# Patient Record
Sex: FEMALE | Race: WHITE | NOT HISPANIC OR LATINO
[De-identification: names, ages, dates, MRNs, and addresses within clinical notes are randomized per-mention and may not be internally consistent; named-entity substitution may affect disease eponyms.]

---

## 2017-02-24 ENCOUNTER — TRANSCRIPTION ENCOUNTER (OUTPATIENT)
Age: 67
End: 2017-02-24

## 2021-05-11 ENCOUNTER — NON-APPOINTMENT (OUTPATIENT)
Age: 71
End: 2021-05-11

## 2021-05-11 ENCOUNTER — APPOINTMENT (OUTPATIENT)
Dept: CARDIOLOGY | Facility: CLINIC | Age: 71
End: 2021-05-11
Payer: MEDICARE

## 2021-05-11 VITALS
OXYGEN SATURATION: 99 % | DIASTOLIC BLOOD PRESSURE: 62 MMHG | WEIGHT: 156 LBS | HEIGHT: 65 IN | SYSTOLIC BLOOD PRESSURE: 138 MMHG | HEART RATE: 56 BPM | TEMPERATURE: 97.5 F | BODY MASS INDEX: 25.99 KG/M2

## 2021-05-11 PROCEDURE — 93000 ELECTROCARDIOGRAM COMPLETE: CPT

## 2021-05-11 PROCEDURE — 99072 ADDL SUPL MATRL&STAF TM PHE: CPT

## 2021-05-11 PROCEDURE — 99204 OFFICE O/P NEW MOD 45 MIN: CPT

## 2021-05-11 NOTE — HISTORY OF PRESENT ILLNESS
[FreeTextEntry1] : 71-year old female who was recently moved to [place] and who is being seen as a new patient for cardiac follow-up.  She has a history of paroxysmal atrial fibrillation, nonobstructive CAD, hypertension, hypercholesterolemia, and diabetes.  She was switched from diltiazem to sotalol about 6 months ago because of recurring palpitations, but has not had any clinical atrial fibrillation since.  She has no history of underlying structural heart disease and her records show a normal echocardiogram and nuclear stress test done during the past year.  She has no current complaints.

## 2021-05-11 NOTE — REVIEW OF SYSTEMS
[Memory Lapses Or Loss] : memory lapses or loss [Negative] : Gastrointestinal [de-identified] : Issues with recent memory and has stopped driving.

## 2021-05-11 NOTE — DISCUSSION/SUMMARY
[FreeTextEntry1] : Ms Villavicencio is a 71-year-old female with a history of paroxysmal atrial fibrillation and nonobstructive CAD being seen to establish care.  She has no current complaints.  Her exam shows regular rhythm, normal blood pressure, clear lungs, and a normal cardiac exam.  An EKG shows some biphasic T waves in V2 and V3.\par \par She is doing well at present and no changes were made in her regimen.  She will be seeing a new internist and neurologist in the next few weeks.  She will follow-up here in 6 months.

## 2021-05-18 ENCOUNTER — LABORATORY RESULT (OUTPATIENT)
Age: 71
End: 2021-05-18

## 2021-05-18 ENCOUNTER — APPOINTMENT (OUTPATIENT)
Dept: FAMILY MEDICINE | Facility: CLINIC | Age: 71
End: 2021-05-18
Payer: MEDICARE

## 2021-05-18 DIAGNOSIS — Z83.49 FAMILY HISTORY OF OTHER ENDOCRINE, NUTRITIONAL AND METABOLIC DISEASES: ICD-10-CM

## 2021-05-18 DIAGNOSIS — G62.9 POLYNEUROPATHY, UNSPECIFIED: ICD-10-CM

## 2021-05-18 DIAGNOSIS — Z82.49 FAMILY HISTORY OF ISCHEMIC HEART DISEASE AND OTHER DISEASES OF THE CIRCULATORY SYSTEM: ICD-10-CM

## 2021-05-18 DIAGNOSIS — Z72.89 OTHER PROBLEMS RELATED TO LIFESTYLE: ICD-10-CM

## 2021-05-18 DIAGNOSIS — Z78.9 OTHER SPECIFIED HEALTH STATUS: ICD-10-CM

## 2021-05-18 DIAGNOSIS — Z87.891 PERSONAL HISTORY OF NICOTINE DEPENDENCE: ICD-10-CM

## 2021-05-18 PROCEDURE — 99387 INIT PM E/M NEW PAT 65+ YRS: CPT | Mod: GY,25

## 2021-05-18 PROCEDURE — 99072 ADDL SUPL MATRL&STAF TM PHE: CPT

## 2021-05-18 PROCEDURE — 99213 OFFICE O/P EST LOW 20 MIN: CPT | Mod: 25

## 2021-05-20 ENCOUNTER — TRANSCRIPTION ENCOUNTER (OUTPATIENT)
Age: 71
End: 2021-05-20

## 2021-05-20 NOTE — ADDENDUM
[FreeTextEntry1] : I, Gabby Butt acting as a scribe for Dr. Cha Parish MD on 05/18/2021 at 2:54 PM.

## 2021-05-20 NOTE — COUNSELING
[de-identified] : the value and YARELIS  of daily walk, 30 minutes 5 x's per week,  stressed with regard  to overall health, mental health and bone density\par

## 2021-05-20 NOTE — REVIEW OF SYSTEMS
[Fever] : no fever [Fatigue] : fatigue [Night Sweats] : no night sweats [Hearing Loss] : hearing loss [Nasal Discharge] : no nasal discharge [Chest Pain] : no chest pain [Palpitations] : no palpitations [Joint Pain] : joint pain [Joint Stiffness] : joint stiffness [Headache] : no headache [Dizziness] : no dizziness [Anxiety] : no anxiety [Depression] : no depression [Negative] : Gastrointestinal [FreeTextEntry9] : JOHN

## 2021-05-20 NOTE — PLAN
[FreeTextEntry1] : CPE/NP exam  for 71 year old F with PMH as stated in HPI / active list. \par \par Management : \par \par See HPI and Plan\par \par Labs in office today. Will advise. \par \par Best wishes offered !

## 2021-05-20 NOTE — HISTORY OF PRESENT ILLNESS
[Family Member] : family member [FreeTextEntry1] : New patient here to establish care, recently moved here from [place] \par discuss memory issues \par barths [place] \par allergies to Sulfa  [de-identified] : MICHOACANO WADDELL presents today to establish care being referred to me by daughter who is  also a NP here and present today. \par she is an affable 71 year old F with PMH significant for DM, CAD, AFIB, HTN HLD and  neuropathy as well as RA \par Some heightened concerns for memory asper self and family members  Endorses MMSE  29/30\par PSH significant for Hysterectomy > 30 years  ago \par \par Denies any recent ER visits/hospitalizations/ MVA's or MSK injuries.\par \par Providers:  Cardiology  Dr. Frederick\par                    NEEDS ENDO\par                    Requesting Neurology Consult \par \par Social:  lives alone;   ; \par              ALVAREZ user of marijuana   "for anxiety and pain"

## 2021-05-20 NOTE — END OF VISIT
[FreeTextEntry3] : Medical record entries made by the scribe today, were at my direction and personally dictated to them by me, Dr. Cha Parish on 05/18/2021. I have reviewed the chart and agree that the record accurately reflects my personal performance of the history, physical exam, assessment and plan.

## 2021-05-20 NOTE — HEALTH RISK ASSESSMENT
[Monthly or less (1 pt)] : Monthly or less (1 point) [1 or 2 (0 pts)] : 1 or 2 (0 points) [Never (0 pts)] : Never (0 points) [Yes] : In the past 12 months have you used drugs other than those required for medical reasons? Yes [No falls in past year] : Patient reported no falls in the past year [0] : 2) Feeling down, depressed, or hopeless: Not at all (0) [Patient reported mammogram was normal] : Patient reported mammogram was normal [Patient reported colonoscopy was normal] : Patient reported colonoscopy was normal [] : No [de-identified] : marijuana  [MammogramDate] : 02/2020 [ColonoscopyDate] : 01/2016

## 2021-05-25 LAB
CREAT SPEC-SCNC: 127 MG/DL
MICROALBUMIN 24H UR DL<=1MG/L-MCNC: <1.2 MG/DL
MICROALBUMIN/CREAT 24H UR-RTO: NORMAL MG/G

## 2021-06-01 ENCOUNTER — TRANSCRIPTION ENCOUNTER (OUTPATIENT)
Age: 71
End: 2021-06-01

## 2021-06-03 ENCOUNTER — APPOINTMENT (OUTPATIENT)
Dept: NEUROLOGY | Facility: CLINIC | Age: 71
End: 2021-06-03

## 2021-06-10 ENCOUNTER — APPOINTMENT (OUTPATIENT)
Dept: RHEUMATOLOGY | Facility: CLINIC | Age: 71
End: 2021-06-10

## 2021-06-17 ENCOUNTER — TRANSCRIPTION ENCOUNTER (OUTPATIENT)
Age: 71
End: 2021-06-17

## 2021-07-14 ENCOUNTER — TRANSCRIPTION ENCOUNTER (OUTPATIENT)
Age: 71
End: 2021-07-14

## 2021-08-21 ENCOUNTER — RX RENEWAL (OUTPATIENT)
Age: 71
End: 2021-08-21

## 2021-08-25 ENCOUNTER — APPOINTMENT (OUTPATIENT)
Dept: MRI IMAGING | Facility: CLINIC | Age: 71
End: 2021-08-25
Payer: MEDICARE

## 2021-08-25 PROCEDURE — 70551 MRI BRAIN STEM W/O DYE: CPT

## 2021-08-27 ENCOUNTER — NON-APPOINTMENT (OUTPATIENT)
Age: 71
End: 2021-08-27

## 2021-08-31 LAB
HBA1C MFR BLD HPLC: 6.7
LDLC SERPL DIRECT ASSAY-MCNC: 106

## 2021-09-01 ENCOUNTER — APPOINTMENT (OUTPATIENT)
Dept: ENDOCRINOLOGY | Facility: CLINIC | Age: 71
End: 2021-09-01
Payer: MEDICARE

## 2021-09-01 ENCOUNTER — TRANSCRIPTION ENCOUNTER (OUTPATIENT)
Age: 71
End: 2021-09-01

## 2021-09-01 ENCOUNTER — RESULT CHARGE (OUTPATIENT)
Age: 71
End: 2021-09-01

## 2021-09-01 VITALS
OXYGEN SATURATION: 98 % | BODY MASS INDEX: 25.99 KG/M2 | WEIGHT: 156 LBS | HEIGHT: 65 IN | SYSTOLIC BLOOD PRESSURE: 130 MMHG | HEART RATE: 55 BPM | DIASTOLIC BLOOD PRESSURE: 70 MMHG | TEMPERATURE: 97.6 F

## 2021-09-01 LAB — GLUCOSE BLDC GLUCOMTR-MCNC: 181

## 2021-09-01 PROCEDURE — 99205 OFFICE O/P NEW HI 60 MIN: CPT | Mod: 25

## 2021-09-01 PROCEDURE — 82962 GLUCOSE BLOOD TEST: CPT

## 2021-09-01 RX ORDER — BLOOD SUGAR DIAGNOSTIC
STRIP MISCELLANEOUS
Qty: 100 | Refills: 0 | Status: DISCONTINUED | COMMUNITY
Start: 2021-06-01 | End: 2021-09-01

## 2021-09-01 NOTE — ASSESSMENT
[FreeTextEntry1] : 70 yo woman with diagnoses of DM2, HTN, HLD and overweight and also hypoT and Afib comes for initial visit. She has neuropathy. She states she has malabsorption and she has pernicious anemia. \par Daughter concerned about memory impairment. She tried MF IR and severe diarrhea. \par \par DM2 : bgs lunch 's \par continue levemir 18 u Hs \par reluctant to change to orals \par she eats very little and A1c 6.7 bc she eats very little carbs \par discussed diet and exercise\par encouraged more exercise walking 30 min 3 x week\par See CDE for diet teaching\par Needs to try to have more protein for meals\par Importance of blood glucose monitoring discussed. Targets reviewed. Recommended pt try to keep blood glucose level below 130 (110) before meals and below 160 (140) two hours after meals.\par hammer toes: followup with podiatry \par GFr normal. \par continue ASA \par check evelyn/c ratio \par covid vaccine done \par reccomm eye exam annually. \par feet neuropathy: continue with B12 supplement \par \par HypoT / Hashimoto's \par decrease Lt4 88 mcg qd and take 1/2 pill on Sundays \par Take daily in AM on an empty stomach at least 30 minutes before eating or taking other medication.\par \par HTN: bp at target on meds. Continue current management.\par I advised low fat/low cholesterol diet, low salt diet, and weight loss\par \par HLD: LDL at target. continue statin \par low fat/low cholesterol diet and weight loss advised\par \par OverweighT \par discussed diet and exercise\par encouraged more exercise walking 30 min 3 x week\par Needs to try to have more protein for meals\par \par

## 2021-09-01 NOTE — HISTORY OF PRESENT ILLNESS
[FreeTextEntry1] : quality: dm2 \par onset 2016 \par severity: moderate \par modifying factors: diet helps and exercise \par associated factors: RA, HTN, HLD. \par

## 2021-09-20 ENCOUNTER — TRANSCRIPTION ENCOUNTER (OUTPATIENT)
Age: 71
End: 2021-09-20

## 2021-11-08 ENCOUNTER — RX RENEWAL (OUTPATIENT)
Age: 71
End: 2021-11-08

## 2021-11-08 ENCOUNTER — APPOINTMENT (OUTPATIENT)
Dept: CARDIOLOGY | Facility: CLINIC | Age: 71
End: 2021-11-08
Payer: MEDICARE

## 2021-11-08 ENCOUNTER — NON-APPOINTMENT (OUTPATIENT)
Age: 71
End: 2021-11-08

## 2021-11-08 VITALS
DIASTOLIC BLOOD PRESSURE: 75 MMHG | WEIGHT: 153 LBS | HEIGHT: 65 IN | OXYGEN SATURATION: 100 % | SYSTOLIC BLOOD PRESSURE: 119 MMHG | RESPIRATION RATE: 17 BRPM | HEART RATE: 55 BPM | BODY MASS INDEX: 25.49 KG/M2

## 2021-11-08 PROCEDURE — 99214 OFFICE O/P EST MOD 30 MIN: CPT

## 2021-11-08 PROCEDURE — 93000 ELECTROCARDIOGRAM COMPLETE: CPT

## 2021-11-08 NOTE — HISTORY OF PRESENT ILLNESS
[FreeTextEntry1] : 71-year-old female with a history of paroxysmal atrial fibrillation, hypertension, diabetes, hypercholesterolemia, nonobstructive CAD.  She has been feeling generally well with no palpitations.  Her insulin has been increased.  She was started on sertraline 25 mg/day by her psychiatrist, but has decided that she will stop taking it.  She remains physically active and has no exertional complaints.\par \par She had one episode of rapid heart beating with low blood pressure which occurred during a period of extreme emotional distress.  She does not think she was in atrial fibrillation at this time.

## 2021-11-08 NOTE — ASSESSMENT
[FreeTextEntry1] : Ms Villavicencio has no new cardiac complaints and is not having any palpitations.  Her exam shows regular rhythm, normal blood pressure, clear lungs, and a normal cardiac exam.  Her EKG shows sinus rhythm with a corrected QT interval of 468 ms and some borderline ST segment depression.\par \par She is doing well and no change was made in her regimen.  She will continue on sotalol 80 twice daily, irbesartan 300, Xarelto 20, simvastatin 20.  She will follow-up in 6 months.

## 2021-11-17 ENCOUNTER — RX RENEWAL (OUTPATIENT)
Age: 71
End: 2021-11-17

## 2021-11-19 ENCOUNTER — TRANSCRIPTION ENCOUNTER (OUTPATIENT)
Age: 71
End: 2021-11-19

## 2021-11-21 ENCOUNTER — TRANSCRIPTION ENCOUNTER (OUTPATIENT)
Age: 71
End: 2021-11-21

## 2021-11-23 ENCOUNTER — APPOINTMENT (OUTPATIENT)
Dept: FAMILY MEDICINE | Facility: CLINIC | Age: 71
End: 2021-11-23
Payer: MEDICARE

## 2021-11-23 VITALS
RESPIRATION RATE: 16 BRPM | DIASTOLIC BLOOD PRESSURE: 72 MMHG | HEART RATE: 55 BPM | TEMPERATURE: 97.3 F | HEIGHT: 65 IN | SYSTOLIC BLOOD PRESSURE: 112 MMHG | WEIGHT: 149 LBS | BODY MASS INDEX: 24.83 KG/M2 | OXYGEN SATURATION: 93 %

## 2021-11-23 PROCEDURE — 99214 OFFICE O/P EST MOD 30 MIN: CPT

## 2021-11-25 PROCEDURE — 99285 EMERGENCY DEPT VISIT HI MDM: CPT

## 2021-11-26 ENCOUNTER — OUTPATIENT (OUTPATIENT)
Dept: OUTPATIENT SERVICES | Facility: HOSPITAL | Age: 71
LOS: 1 days | End: 2021-11-26

## 2021-11-26 PROCEDURE — 71045 X-RAY EXAM CHEST 1 VIEW: CPT | Mod: 26

## 2021-11-26 PROCEDURE — 93306 TTE W/DOPPLER COMPLETE: CPT | Mod: 26

## 2021-11-26 PROCEDURE — 93010 ELECTROCARDIOGRAM REPORT: CPT

## 2021-11-27 ENCOUNTER — INPATIENT (INPATIENT)
Facility: HOSPITAL | Age: 71
LOS: 3 days | Discharge: HOME | End: 2021-12-01
Payer: MEDICARE

## 2021-11-27 ENCOUNTER — OUTPATIENT (OUTPATIENT)
Dept: OUTPATIENT SERVICES | Facility: HOSPITAL | Age: 71
LOS: 1 days | End: 2021-11-27

## 2021-11-28 ENCOUNTER — OUTPATIENT (OUTPATIENT)
Dept: OUTPATIENT SERVICES | Facility: HOSPITAL | Age: 71
LOS: 1 days | End: 2021-11-28

## 2021-11-28 ENCOUNTER — NON-APPOINTMENT (OUTPATIENT)
Age: 71
End: 2021-11-28

## 2021-11-29 ENCOUNTER — OUTPATIENT (OUTPATIENT)
Dept: OUTPATIENT SERVICES | Facility: HOSPITAL | Age: 71
LOS: 1 days | End: 2021-11-29

## 2021-11-30 ENCOUNTER — OUTPATIENT (OUTPATIENT)
Dept: OUTPATIENT SERVICES | Facility: HOSPITAL | Age: 71
LOS: 1 days | End: 2021-11-30

## 2021-12-01 ENCOUNTER — OUTPATIENT (OUTPATIENT)
Dept: OUTPATIENT SERVICES | Facility: HOSPITAL | Age: 71
LOS: 1 days | End: 2021-12-01

## 2021-12-01 NOTE — HISTORY OF PRESENT ILLNESS
[FreeTextEntry8] : Accompanied by daughter Rosa; \par \par The pt has for 1 week, experienced a  loss of appetite,   low grade fever  ( T max 100.9) none today ,  and fatigue and  "winded " on activities, resolves with rest.

## 2021-12-01 NOTE — ASSESSMENT
[FreeTextEntry1] : Overall vague complaints and symptoms with varied fever pattern in 71 year old WF with Chronic medical conditions as noted in HPI . \par \par Agree at this tie to go to lab tomorrow to assess for anemia/ infection/ electrolytes. \par \par Urged to HYDRATE and report return of altered stools or fever.

## 2021-12-01 NOTE — PHYSICAL EXAM
[No Acute Distress] : no acute distress [Normal Sclera/Conjunctiva] : normal sclera/conjunctiva [No JVD] : no jugular venous distention [No Lymphadenopathy] : no lymphadenopathy [Supple] : supple [No Respiratory Distress] : no respiratory distress  [No Accessory Muscle Use] : no accessory muscle use [Clear to Auscultation] : lungs were clear to auscultation bilaterally [Regular Rhythm] : with a regular rhythm [Normal S1, S2] : normal S1 and S2 [No Edema] : there was no peripheral edema [Soft] : abdomen soft [Non Tender] : non-tender [Non-distended] : non-distended [No HSM] : no HSM [Normal Supraclavicular Nodes] : no supraclavicular lymphadenopathy [No CVA Tenderness] : no CVA  tenderness [No Spinal Tenderness] : no spinal tenderness [No Rash] : no rash [No Focal Deficits] : no focal deficits [Alert and Oriented x3] : oriented to person, place, and time [Normal Insight/Judgement] : insight and judgment were intact [de-identified] : appears frail and weary  [de-identified] : SHADI  [de-identified] : waem and dry  [de-identified] : gait independent

## 2021-12-01 NOTE — REVIEW OF SYSTEMS
[Fatigue] : fatigue [Dyspnea on Exertion] : dyspnea on exertion [Joint Stiffness] : joint stiffness [Depression] : depression [Negative] : Eyes [Fever] : no fever [Chills] : no chills [Nasal Discharge] : no nasal discharge [Postnasal Drip] : no postnasal drip [Chest Pain] : no chest pain [Palpitations] : no palpitations [Paroxysmal Nocturnal Dyspnea] : no paroxysmal nocturnal dyspnea [Cough] : no cough [Dysuria] : no dysuria [Hematuria] : no hematuria [Joint Pain] : no joint pain [Skin Rash] : no skin rash [Headache] : no headache [Fainting] : no fainting [Anxiety] : no anxiety

## 2021-12-02 ENCOUNTER — NON-APPOINTMENT (OUTPATIENT)
Age: 71
End: 2021-12-02

## 2021-12-03 ENCOUNTER — APPOINTMENT (OUTPATIENT)
Dept: FAMILY MEDICINE | Facility: CLINIC | Age: 71
End: 2021-12-03
Payer: MEDICARE

## 2021-12-03 VITALS
HEIGHT: 65 IN | WEIGHT: 147 LBS | DIASTOLIC BLOOD PRESSURE: 60 MMHG | RESPIRATION RATE: 16 BRPM | BODY MASS INDEX: 24.49 KG/M2 | OXYGEN SATURATION: 98 % | HEART RATE: 59 BPM | SYSTOLIC BLOOD PRESSURE: 118 MMHG

## 2021-12-03 PROCEDURE — 99496 TRANSJ CARE MGMT HIGH F2F 7D: CPT

## 2021-12-10 NOTE — PHYSICAL EXAM
[No Acute Distress] : no acute distress [Normal Sclera/Conjunctiva] : normal sclera/conjunctiva [No JVD] : no jugular venous distention [No Lymphadenopathy] : no lymphadenopathy [Supple] : supple [No Respiratory Distress] : no respiratory distress  [No Accessory Muscle Use] : no accessory muscle use [Clear to Auscultation] : lungs were clear to auscultation bilaterally [Regular Rhythm] : with a regular rhythm [Normal S1, S2] : normal S1 and S2 [No Edema] : there was no peripheral edema [Soft] : abdomen soft [Non Tender] : non-tender [Non-distended] : non-distended [No HSM] : no HSM [Normal Supraclavicular Nodes] : no supraclavicular lymphadenopathy [No CVA Tenderness] : no CVA  tenderness [No Spinal Tenderness] : no spinal tenderness [No Rash] : no rash [No Focal Deficits] : no focal deficits [Alert and Oriented x3] : oriented to person, place, and time [Normal Insight/Judgement] : insight and judgment were intact [de-identified] : appears frail and weary  [de-identified] : SHADI  [de-identified] : waem and dry  [de-identified] : gait independent

## 2021-12-10 NOTE — HISTORY OF PRESENT ILLNESS
[Post-hospitalization from ___ Hospital] : Post-hospitalization from [unfilled] Hospital [Admitted on: ___] : The patient was admitted on [unfilled] [Discharged on ___] : discharged on [unfilled] [Discharge Summary] : discharge summary [FreeTextEntry2] : the pt was in the hospital for 6 days after falling, and was diagnosed with dehydration and AFib. \par ID ULLOA reviewed today for negative stools and tick analysis. \par Daughter endorses persistent low grade fever; patent needs much encouragement to eat and drink; weakness and not left alone as son or daughter always in attendance. \par \par Patient herself offers no specific complaint\par \par Did follow with endocrinology , Dr. De La Fuente and opines he does not think this is rheumatology in etiology.\par Did suggest stopping Methotrexate and evaluate fever pattern.

## 2021-12-10 NOTE — ASSESSMENT
[FreeTextEntry1] : S/P 3 day hospitalization for FUO and generalized weakness in 71 year old WF with PMH of AFib, HTN, T2 DM Iron deficiency anemia, RA.\par \par Agrees with supportive care at this time.\par Discussed importance of hydration with patient and she did drink 20 ounces of iced tea in office today. \par \par Trend temperatures at this time off methotrexate. \par \par Will follow for remaining labs as done in hospital day of discharge.\par \par Best wishes offered. \par \par \par

## 2021-12-13 ENCOUNTER — RX RENEWAL (OUTPATIENT)
Age: 71
End: 2021-12-13

## 2021-12-20 ENCOUNTER — TRANSCRIPTION ENCOUNTER (OUTPATIENT)
Age: 71
End: 2021-12-20

## 2021-12-28 ENCOUNTER — LABORATORY RESULT (OUTPATIENT)
Age: 71
End: 2021-12-28

## 2021-12-28 ENCOUNTER — APPOINTMENT (OUTPATIENT)
Dept: ENDOCRINOLOGY | Facility: CLINIC | Age: 71
End: 2021-12-28
Payer: SELF-PAY

## 2021-12-28 VITALS
RESPIRATION RATE: 16 BRPM | SYSTOLIC BLOOD PRESSURE: 120 MMHG | WEIGHT: 150 LBS | HEIGHT: 65 IN | OXYGEN SATURATION: 98 % | HEART RATE: 63 BPM | BODY MASS INDEX: 24.99 KG/M2 | TEMPERATURE: 96.7 F | DIASTOLIC BLOOD PRESSURE: 70 MMHG

## 2021-12-28 PROCEDURE — 99214 OFFICE O/P EST MOD 30 MIN: CPT

## 2021-12-29 NOTE — PHYSICAL EXAM
[Alert] : alert [Well Nourished] : well nourished [No Acute Distress] : no acute distress [Well Developed] : well developed [Normal Sclera/Conjunctiva] : normal sclera/conjunctiva [EOMI] : extra ocular movement intact [No Proptosis] : no proptosis [Normal Oropharynx] : the oropharynx was normal [Thyroid Not Enlarged] : the thyroid was not enlarged [No Thyroid Nodules] : no palpable thyroid nodules [No Respiratory Distress] : no respiratory distress [No Accessory Muscle Use] : no accessory muscle use [Clear to Auscultation] : lungs were clear to auscultation bilaterally [Normal S1, S2] : normal S1 and S2 [Normal Rate] : heart rate was normal [Regular Rhythm] : with a regular rhythm [No Edema] : no peripheral edema [Pedal Pulses Normal] : the pedal pulses are present [Normal Bowel Sounds] : normal bowel sounds [Not Tender] : non-tender [Not Distended] : not distended [Soft] : abdomen soft [Normal Anterior Cervical Nodes] : no anterior cervical lymphadenopathy [Spine Straight] : spine straight [No Stigmata of Cushings Syndrome] : no stigmata of Cushings Syndrome [Normal Gait] : normal gait [No Rash] : no rash [No Tremors] : no tremors [Oriented x3] : oriented to person, place, and time [Normal] : normal [Full ROM] : with full range of motion [Acanthosis Nigricans] : no acanthosis nigricans [Diminished Throughout Both Feet] : normal tactile sensation with monofilament testing throughout both feet

## 2021-12-29 NOTE — ASSESSMENT
[Carbohydrate Consistent Diet] : carbohydrate consistent diet [Exercise/Effect on Glucose] : exercise/effect on glucose [Self Monitoring of Blood Glucose] : self monitoring of blood glucose [FreeTextEntry1] : 72 yo woman with diagnoses of DM2, HTN, HLD and overweight and also hypoT and Afib comes for followup. She has neuropathy. She states she has malabsorption and she has pernicious anemia. \par Memory impairment. She tried MF IR and severe diarrhea. \par \par DM2 : she started prednisone for 10 days from rheumatology \par continue levemir 18 u Hs \par reluctant to change to orals \par discussed diet and exercise\par encouraged more exercise walking 30 min 3 x week\par Needs to try to have more protein for meals\par hammer toes: followup with podiatry \par check labs \par continue ASA \par check evelyn/c ratio \par utd eye exam  \par feet neuropathy: continue with B12 supplement \par \par HypoT / Hashimoto's \par cont Lt4 88 mcg qd and take 1/2 pill on Sundays \par \par HTN: bp at target on meds. Continue current management.\par I advised low fat/low cholesterol diet, low salt diet, and weight loss\par \par HLD: check lipids. continue statin \par low fat/low cholesterol diet and weight loss advised\par \par OverweighT \par discussed diet and exercise\par encouraged more exercise walking 30 min 3 x week\par \par

## 2022-01-03 ENCOUNTER — RX RENEWAL (OUTPATIENT)
Age: 72
End: 2022-01-03

## 2022-01-05 ENCOUNTER — NON-APPOINTMENT (OUTPATIENT)
Age: 72
End: 2022-01-05

## 2022-01-11 ENCOUNTER — APPOINTMENT (OUTPATIENT)
Dept: CARDIOLOGY | Facility: CLINIC | Age: 72
End: 2022-01-11
Payer: MEDICARE

## 2022-01-11 ENCOUNTER — NON-APPOINTMENT (OUTPATIENT)
Age: 72
End: 2022-01-11

## 2022-01-11 VITALS
DIASTOLIC BLOOD PRESSURE: 70 MMHG | HEIGHT: 65 IN | HEART RATE: 55 BPM | BODY MASS INDEX: 24.99 KG/M2 | WEIGHT: 150 LBS | SYSTOLIC BLOOD PRESSURE: 126 MMHG | OXYGEN SATURATION: 99 %

## 2022-01-11 PROCEDURE — 99214 OFFICE O/P EST MOD 30 MIN: CPT

## 2022-01-11 PROCEDURE — 93000 ELECTROCARDIOGRAM COMPLETE: CPT

## 2022-01-11 NOTE — HISTORY OF PRESENT ILLNESS
[FreeTextEntry1] : 71-year-old female with a history of paroxysmal atrial fibrillation, hypertension, hyper cholesterolemia, diabetes, nonobstructive CAD returns for follow-up.  She was hospitalized in late November with fever, dehydration, near syncope, and was in atrial fibrillation at the time she arrived in the emergency room.  The cause of the fever was never determined and was felt to possibly be related to her methotrexate.  She was restarted on sotalol and has been home for about a month.  She has not had any palpitations and feels generally well, although her daughter is concerned that she is still weak and there is a questionable history of dyspnea on exertion..

## 2022-01-11 NOTE — DISCUSSION/SUMMARY
[FreeTextEntry1] : Ms Villavicencio had a provoked episode of atrial fibrillation in late October.  She has not had any palpitations since her return home about a month ago.  Her exam shows regular rhythm, rate of about 50, normal blood pressure, clear lungs, and a normal cardiac exam.  Her EKG shows sinus rhythm and is unremarkable with a corrected QT of 480 ms.\par \par She seems back at her baseline.  Her daughter is concerned about her overall condition, but she had an echo done in the hospital and has had a normal nuclear stress test with her stress test within the past 2 years and I do not think she requires further work-up at this time.  She will follow-up according to her usual schedule or if she develops further symptoms.

## 2022-01-24 ENCOUNTER — TRANSCRIPTION ENCOUNTER (OUTPATIENT)
Age: 72
End: 2022-01-24

## 2022-03-04 ENCOUNTER — TRANSCRIPTION ENCOUNTER (OUTPATIENT)
Age: 72
End: 2022-03-04

## 2022-03-10 ENCOUNTER — RX RENEWAL (OUTPATIENT)
Age: 72
End: 2022-03-10

## 2022-03-23 ENCOUNTER — APPOINTMENT (OUTPATIENT)
Dept: ENDOCRINOLOGY | Facility: CLINIC | Age: 72
End: 2022-03-23
Payer: MEDICARE

## 2022-03-23 VITALS
DIASTOLIC BLOOD PRESSURE: 60 MMHG | RESPIRATION RATE: 14 BRPM | HEART RATE: 67 BPM | WEIGHT: 154.25 LBS | BODY MASS INDEX: 25.7 KG/M2 | SYSTOLIC BLOOD PRESSURE: 106 MMHG | HEIGHT: 65 IN | TEMPERATURE: 97.9 F | OXYGEN SATURATION: 99 %

## 2022-03-23 PROCEDURE — 99214 OFFICE O/P EST MOD 30 MIN: CPT

## 2022-03-23 NOTE — ASSESSMENT
[Exercise/Effect on Glucose] : exercise/effect on glucose [Weight Loss] : weight loss [FreeTextEntry1] : 70 yo woman with diagnoses of DM2, HTN, HLD and overweight and also hypoT and Afib comes for followup. She has neuropathy. She states she has malabsorption and she has pernicious anemia. \par Memory impairment. She tried MF IR and severe diarrhea. \par \par DM2 :  last a1c 6.7. \par start metformin 500 mg ER 1 tb BID \par decrease levemir to 9 u hs and send logbook in 1 week \par will try to susana her off insulin and switch to orals  \par discussed diet and exercise\par encouraged more exercise walking 30 min 3 x week\par Needs to try to have more protein for meals\par hammer toes: followup with podiatry \par check labs today \par continue ASA \par check evelyn/c ratio \par utd eye exam  \par diabetic  neuropathy: continue with B12 supplement \par \par HypoT / Hashimoto's \par check tfts today and will call tp w results. \par cont Lt4 88 mcg qd and take 1/2 pill on Sundays \par \par HTN: bp at target on meds. Continue current management.\par I advised low fat/low cholesterol diet, low salt diet, and weight loss\par \par HLD: check lipids. continue statin \par low fat/low cholesterol diet and weight loss advised\par \par OverweighT : gained 4 lbs. \par discussed diet and exercise\par encouraged more exercise walking 30 min 3 x week\par \par

## 2022-03-25 ENCOUNTER — TRANSCRIPTION ENCOUNTER (OUTPATIENT)
Age: 72
End: 2022-03-25

## 2022-03-25 LAB
ALBUMIN SERPL ELPH-MCNC: 4.6 G/DL
ALP BLD-CCNC: 101 U/L
ALT SERPL-CCNC: 15 U/L
ANION GAP SERPL CALC-SCNC: 13 MMOL/L
AST SERPL-CCNC: 20 U/L
BASOPHILS # BLD AUTO: 0.06 K/UL
BASOPHILS NFR BLD AUTO: 0.9 %
BILIRUB SERPL-MCNC: 0.5 MG/DL
BUN SERPL-MCNC: 18 MG/DL
CALCIUM SERPL-MCNC: 9.6 MG/DL
CHLORIDE SERPL-SCNC: 104 MMOL/L
CHOLEST SERPL-MCNC: 206 MG/DL
CO2 SERPL-SCNC: 24 MMOL/L
CREAT SERPL-MCNC: 0.81 MG/DL
CREAT SPEC-SCNC: 96 MG/DL
EGFR: 76 ML/MIN/1.73M2
EOSINOPHIL # BLD AUTO: 0.27 K/UL
EOSINOPHIL NFR BLD AUTO: 4.2 %
ESTIMATED AVERAGE GLUCOSE: 157 MG/DL
GLUCOSE SERPL-MCNC: 131 MG/DL
HBA1C MFR BLD HPLC: 7.1 %
HCT VFR BLD CALC: 39.8 %
HDLC SERPL-MCNC: 48 MG/DL
HGB BLD-MCNC: 12.2 G/DL
IMM GRANULOCYTES NFR BLD AUTO: 0.3 %
LDLC SERPL CALC-MCNC: 114 MG/DL
LDLC SERPL DIRECT ASSAY-MCNC: 120 MG/DL
LYMPHOCYTES # BLD AUTO: 1.21 K/UL
LYMPHOCYTES NFR BLD AUTO: 18.6 %
MAN DIFF?: NORMAL
MCHC RBC-ENTMCNC: 30 PG
MCHC RBC-ENTMCNC: 30.7 GM/DL
MCV RBC AUTO: 97.8 FL
MICROALBUMIN 24H UR DL<=1MG/L-MCNC: <1.2 MG/DL
MICROALBUMIN/CREAT 24H UR-RTO: NORMAL MG/G
MONOCYTES # BLD AUTO: 0.33 K/UL
MONOCYTES NFR BLD AUTO: 5.1 %
NEUTROPHILS # BLD AUTO: 4.61 K/UL
NEUTROPHILS NFR BLD AUTO: 70.9 %
NONHDLC SERPL-MCNC: 158 MG/DL
PLATELET # BLD AUTO: 251 K/UL
POTASSIUM SERPL-SCNC: 4.9 MMOL/L
PROT SERPL-MCNC: 6.6 G/DL
RBC # BLD: 4.07 M/UL
RBC # FLD: 15.6 %
SODIUM SERPL-SCNC: 141 MMOL/L
T3 SERPL-MCNC: 122 NG/DL
T4 FREE SERPL-MCNC: 1.8 NG/DL
TRIGL SERPL-MCNC: 219 MG/DL
TSH SERPL-ACNC: 0.39 UIU/ML
WBC # FLD AUTO: 6.5 K/UL

## 2022-03-28 ENCOUNTER — TRANSCRIPTION ENCOUNTER (OUTPATIENT)
Age: 72
End: 2022-03-28

## 2022-04-01 ENCOUNTER — NON-APPOINTMENT (OUTPATIENT)
Age: 72
End: 2022-04-01

## 2022-04-18 ENCOUNTER — RX RENEWAL (OUTPATIENT)
Age: 72
End: 2022-04-18

## 2022-04-19 ENCOUNTER — NON-APPOINTMENT (OUTPATIENT)
Age: 72
End: 2022-04-19

## 2022-05-04 ENCOUNTER — APPOINTMENT (OUTPATIENT)
Dept: ENDOCRINOLOGY | Facility: CLINIC | Age: 72
End: 2022-05-04
Payer: MEDICARE

## 2022-05-04 VITALS
WEIGHT: 151 LBS | OXYGEN SATURATION: 97 % | HEIGHT: 65 IN | RESPIRATION RATE: 14 BRPM | SYSTOLIC BLOOD PRESSURE: 90 MMHG | DIASTOLIC BLOOD PRESSURE: 60 MMHG | TEMPERATURE: 97 F | HEART RATE: 58 BPM | BODY MASS INDEX: 25.16 KG/M2

## 2022-05-04 PROCEDURE — 99214 OFFICE O/P EST MOD 30 MIN: CPT

## 2022-05-04 RX ORDER — INSULIN DETEMIR 100 [IU]/ML
100 INJECTION, SOLUTION SUBCUTANEOUS
Qty: 1 | Refills: 0 | Status: DISCONTINUED | COMMUNITY
Start: 2021-05-11 | End: 2022-05-04

## 2022-05-04 NOTE — ASSESSMENT
[Carbohydrate Consistent Diet] : carbohydrate consistent diet [Exercise/Effect on Glucose] : exercise/effect on glucose [Self Monitoring of Blood Glucose] : self monitoring of blood glucose [FreeTextEntry1] : 72 yo woman with diagnoses of DM2, HTN, HLD and overweight and also hypoT and Afib. \par She has neuropathy. She states she has malabsorption.Memory impairment. She tried MF IR and severe diarrhea. \par \par DM2 : bgs excellent. \par cont metformin 500 mg ER 1 tb BID \par she has diarrhea with Mf. \par decrease MF  mg qd and add jardiance 10 mg qd  \par discussed diet and exercise\par encouraged more exercise walking 30 min 3 x week\par hammer toes: followup with podiatry \par continue ASA \par check evelyn/c ratio \par diabetic  neuropathy: continue with B12 supplement \par \par HypoT / Hashimoto's \par cont Lt4 88 mcg qd and take 1/2 pill on Sundays \par \par HTN: bp at target on meds. Continue current management.\par I advised low fat/low cholesterol diet, low salt diet, and weight loss\par \par HLD:  LDL above target. She will d/w cardiology soon. statin \par low fat/low cholesterol diet and weight loss advised\par \par OverweighT : discussed diet and exercise\par encouraged more exercise walking 30 min 3 x week\par \par

## 2022-05-04 NOTE — PHYSICAL EXAM
[Alert] : alert [Well Nourished] : well nourished [No Acute Distress] : no acute distress [Well Developed] : well developed [Normal Sclera/Conjunctiva] : normal sclera/conjunctiva [EOMI] : extra ocular movement intact [No Proptosis] : no proptosis [Normal Oropharynx] : the oropharynx was normal [Thyroid Not Enlarged] : the thyroid was not enlarged [No Thyroid Nodules] : no palpable thyroid nodules [No Respiratory Distress] : no respiratory distress [No Accessory Muscle Use] : no accessory muscle use [Clear to Auscultation] : lungs were clear to auscultation bilaterally [Normal S1, S2] : normal S1 and S2 [Normal Rate] : heart rate was normal [Regular Rhythm] : with a regular rhythm [No Edema] : no peripheral edema [Pedal Pulses Normal] : the pedal pulses are present [Normal Bowel Sounds] : normal bowel sounds [Not Tender] : non-tender [Not Distended] : not distended [Soft] : abdomen soft [Normal Anterior Cervical Nodes] : no anterior cervical lymphadenopathy [No Tremors] : no tremors [Oriented x3] : oriented to person, place, and time [Right foot was examined, including] : right foot ~C was examined, including visual inspection with sensory and pulse exams [Left foot was examined, including] : left foot ~C was examined, including visual inspection with sensory and pulse exams [Normal] : normal [Full ROM] : with full range of motion [Acanthosis Nigricans] : no acanthosis nigricans [Delayed in the Right Toes] : normal in the toes [Delayed in the Left Toes] : normal in the toes [Vibration Dec.] : normal vibratory sensation at the level of the toes

## 2022-05-09 ENCOUNTER — NON-APPOINTMENT (OUTPATIENT)
Age: 72
End: 2022-05-09

## 2022-05-09 ENCOUNTER — APPOINTMENT (OUTPATIENT)
Dept: CARDIOLOGY | Facility: CLINIC | Age: 72
End: 2022-05-09
Payer: MEDICARE

## 2022-05-09 VITALS
BODY MASS INDEX: 24.96 KG/M2 | DIASTOLIC BLOOD PRESSURE: 60 MMHG | WEIGHT: 150 LBS | SYSTOLIC BLOOD PRESSURE: 120 MMHG | HEART RATE: 55 BPM | OXYGEN SATURATION: 100 %

## 2022-05-09 PROCEDURE — 93000 ELECTROCARDIOGRAM COMPLETE: CPT

## 2022-05-09 PROCEDURE — 99214 OFFICE O/P EST MOD 30 MIN: CPT

## 2022-05-09 RX ORDER — SIMVASTATIN 20 MG/1
20 TABLET, FILM COATED ORAL
Qty: 90 | Refills: 3 | Status: DISCONTINUED | COMMUNITY
Start: 2021-05-11 | End: 2022-05-09

## 2022-05-09 NOTE — ASSESSMENT
[FreeTextEntry1] : Ms Villavicencio has not had palpitations or other cardiac symptoms.  She is noticing some nausea and mild diarrhea which she thinks may be due to metformin, but she has had similar symptoms prior to starting the drug.  Her exam shows regular rhythm, normal blood pressure, clear lungs, and a normal cardiac exam.  Her EKG shows sinus rhythm with a corrected QT interval of 478 ms.\par \par And she is stable at present.  I discontinued her simvastatin and switched her to rosuvastatin 20.  I also suggested she consider getting a sonal for her smart watch if she wanted to know if she was in atrial fibrillation when she had some symptoms.  She\par \par She will continue on sotalol 80 twice daily, Xarelto 20, irbesartan 300, metformin, Jardiance 10, and newly started rosuvastatin 20.  She will follow-up in 4 months.

## 2022-05-09 NOTE — HISTORY OF PRESENT ILLNESS
[FreeTextEntry1] : 72-year-old female with a history of paroxysmal atrial fibrillation, nonobstructive CAD, hypertension, hypercholesterolemia, diabetes.  She has not had any palpitations or other cardiac symptoms since she was last seen in January.  She has been taken off insulin by her endocrinologist and is now on metformin and Jardiance.  She is having some GI side effects.  Her endocrinologist also noted her LDL cholesterol of 114 on simvastatin 20 and suggested that she might switch statins.

## 2022-05-10 ENCOUNTER — TRANSCRIPTION ENCOUNTER (OUTPATIENT)
Age: 72
End: 2022-05-10

## 2022-05-27 ENCOUNTER — APPOINTMENT (OUTPATIENT)
Dept: FAMILY MEDICINE | Facility: CLINIC | Age: 72
End: 2022-05-27
Payer: MEDICARE

## 2022-05-27 VITALS
SYSTOLIC BLOOD PRESSURE: 116 MMHG | BODY MASS INDEX: 24.83 KG/M2 | OXYGEN SATURATION: 97 % | RESPIRATION RATE: 16 BRPM | TEMPERATURE: 97.1 F | WEIGHT: 149 LBS | HEART RATE: 50 BPM | HEIGHT: 65 IN | DIASTOLIC BLOOD PRESSURE: 64 MMHG

## 2022-05-27 DIAGNOSIS — R19.7 DIARRHEA, UNSPECIFIED: ICD-10-CM

## 2022-05-27 DIAGNOSIS — Z86.39 PERSONAL HISTORY OF OTHER ENDOCRINE, NUTRITIONAL AND METABOLIC DISEASE: ICD-10-CM

## 2022-05-27 DIAGNOSIS — R53.1 WEAKNESS: ICD-10-CM

## 2022-05-27 DIAGNOSIS — Z00.00 ENCOUNTER FOR GENERAL ADULT MEDICAL EXAMINATION W/OUT ABNORMAL FINDINGS: ICD-10-CM

## 2022-05-27 DIAGNOSIS — Z86.19 PERSONAL HISTORY OF OTHER INFECTIOUS AND PARASITIC DISEASES: ICD-10-CM

## 2022-05-27 DIAGNOSIS — R50.9 FEVER, UNSPECIFIED: ICD-10-CM

## 2022-05-27 PROCEDURE — G0442 ANNUAL ALCOHOL SCREEN 15 MIN: CPT | Mod: 59

## 2022-05-27 PROCEDURE — G0439: CPT

## 2022-05-27 PROCEDURE — G0444 DEPRESSION SCREEN ANNUAL: CPT

## 2022-05-31 ENCOUNTER — INPATIENT (INPATIENT)
Facility: HOSPITAL | Age: 72
LOS: 0 days | Discharge: HOME | End: 2022-06-01
Payer: MEDICARE

## 2022-05-31 PROCEDURE — 72170 X-RAY EXAM OF PELVIS: CPT | Mod: 26

## 2022-05-31 PROCEDURE — 70450 CT HEAD/BRAIN W/O DYE: CPT | Mod: 26,MA

## 2022-05-31 PROCEDURE — 93010 ELECTROCARDIOGRAM REPORT: CPT

## 2022-05-31 PROCEDURE — 99285 EMERGENCY DEPT VISIT HI MDM: CPT

## 2022-05-31 PROCEDURE — 72125 CT NECK SPINE W/O DYE: CPT | Mod: 26,MA

## 2022-05-31 PROCEDURE — 71045 X-RAY EXAM CHEST 1 VIEW: CPT | Mod: 26

## 2022-06-01 ENCOUNTER — OUTPATIENT (OUTPATIENT)
Dept: OUTPATIENT SERVICES | Facility: HOSPITAL | Age: 72
LOS: 1 days | End: 2022-06-01

## 2022-06-01 PROBLEM — Z00.00 ENCOUNTER FOR PREVENTIVE HEALTH EXAMINATION: Status: ACTIVE | Noted: 2021-05-04

## 2022-06-01 PROBLEM — R19.7 FREQUENT LOOSE STOOLS: Status: ACTIVE | Noted: 2022-05-27

## 2022-06-01 PROBLEM — Z86.19 HISTORY OF VIRAL INFECTION: Status: RESOLVED | Noted: 2021-12-01 | Resolved: 2022-06-01

## 2022-06-01 PROCEDURE — 99223 1ST HOSP IP/OBS HIGH 75: CPT

## 2022-06-01 PROCEDURE — 70450 CT HEAD/BRAIN W/O DYE: CPT | Mod: 26

## 2022-06-01 NOTE — COUNSELING
[de-identified] : AGAIN advised the value and YARELIS  of daily walk, 30 minutes 5 x's per week,  stressed with regard  to overall health, mental health and bone density\par

## 2022-06-01 NOTE — ASSESSMENT
[FreeTextEntry1] : Annual exam for 72  year old WF with PMH as stated in HPI / active list. \par \par Management : \par \par Declines MAMMO  \par Cologuard for CRC Screen.\par \par \par See HPI and Plan\par \par Labs in office today.   Will advise. \par \par Best wishes offered !\par

## 2022-06-01 NOTE — HISTORY OF PRESENT ILLNESS
[Family Member] : family member [FreeTextEntry1] : [facility]\par \par Last seen in DEC. 2021 :   In review: Dec. 3, 2021: \par FU after hospital visit in December 2021 \par Eventually recovered and diagnosis for Ilness next fully determined. \par \par Since has been following with : \par ENDO: Dr. Bartlett DM type 2 MF / Jardiance/B12\par Cardiology Dr. Otoniel PA Afib/CAD/HTN/HLD; now on Crestor; advised sonal for  cell phone to monitor for AFib; takes sotalol and irbesartan Xarelto. \par \par Rheumatology Dr. De La Fuente  RA  MTX/Folic Acid \par \par No recent ER /UC visits.\par No FALL.  [de-identified] : \par \par In review: NP visit May 2021\par MICHOACANO WADDELL presents today to establish care being referred to me by daughter who is  also a NP here and present today. \par she is an affable 71 year old F with PMH significant for DM, CAD, AFIB, HTN HLD and  neuropathy as well as RA \par Some heightened concerns for memory asper self and family members  Endorses MMSE  29/30\par PSH significant for Hysterectomy > 30 years  ago \par \par Denies any recent ER visits/hospitalizations/ MVA's or MSK injuries.\par \par Providers:  Cardiology  Dr. Frederick\par                    NEEDS ENDO\par                    Requesting Neurology Consult \par \par Social:  lives alone;   ; \par              ALVAREZ user of marijuana   "for anxiety and pain"

## 2022-06-01 NOTE — REVIEW OF SYSTEMS
[Fatigue] : fatigue [Hearing Loss] : hearing loss [Joint Pain] : joint pain [Joint Stiffness] : joint stiffness [Negative] : Gastrointestinal [Fever] : no fever [Night Sweats] : no night sweats [Nasal Discharge] : no nasal discharge [Chest Pain] : no chest pain [Palpitations] : no palpitations [Headache] : no headache [Dizziness] : no dizziness [Anxiety] : no anxiety [Depression] : no depression [FreeTextEntry9] : JOHN

## 2022-06-01 NOTE — HEALTH RISK ASSESSMENT
[Good] : ~his/her~  mood as  good [Monthly or less (1 pt)] : Monthly or less (1 point) [1 or 2 (0 pts)] : 1 or 2 (0 points) [Never (0 pts)] : Never (0 points) [Yes] : In the past 12 months have you used drugs other than those required for medical reasons? Yes [No falls in past year] : Patient reported no falls in the past year [0] : 2) Feeling down, depressed, or hopeless: Not at all (0) [PHQ-2 Negative - No further assessment needed] : PHQ-2 Negative - No further assessment needed [Patient reported mammogram was normal] : Patient reported mammogram was normal [Patient reported colonoscopy was normal] : Patient reported colonoscopy was normal [With Patient/Caregiver] : , with patient/caregiver [Designated Healthcare Proxy] : Designated healthcare proxy [Name: ___] : Health Care Proxy's Name: [unfilled]  [Relationship: ___] : Relationship: [unfilled] [de-identified] : Abbie [de-identified] : as noted in hpi  [de-identified] : NO [de-identified] : marijuana daily  for anxiety and pain  [de-identified] : not really   [de-identified] : helathy  [[id number]] : 0 [MammogramDate] : 02/2020 [MammogramComments] : [facility]  [ColonoscopyDate] : 01/2016 [AdvancecareDate] : 05/27/2002

## 2022-06-02 ENCOUNTER — TRANSCRIPTION ENCOUNTER (OUTPATIENT)
Age: 72
End: 2022-06-02

## 2022-06-02 ENCOUNTER — NON-APPOINTMENT (OUTPATIENT)
Age: 72
End: 2022-06-02

## 2022-06-06 ENCOUNTER — TRANSCRIPTION ENCOUNTER (OUTPATIENT)
Age: 72
End: 2022-06-06

## 2022-06-06 DIAGNOSIS — E03.9 HYPOTHYROIDISM, UNSPECIFIED: ICD-10-CM

## 2022-06-06 DIAGNOSIS — Z82.49 FAMILY HISTORY OF ISCHEMIC HEART DISEASE AND OTHER DISEASES OF THE CIRCULATORY SYSTEM: ICD-10-CM

## 2022-06-06 DIAGNOSIS — R55 SYNCOPE AND COLLAPSE: ICD-10-CM

## 2022-06-06 DIAGNOSIS — T14.90XA INJURY, UNSPECIFIED, INITIAL ENCOUNTER: ICD-10-CM

## 2022-06-06 DIAGNOSIS — E11.9 TYPE 2 DIABETES MELLITUS WITHOUT COMPLICATIONS: ICD-10-CM

## 2022-06-06 DIAGNOSIS — Z20.822 CONTACT WITH AND (SUSPECTED) EXPOSURE TO COVID-19: ICD-10-CM

## 2022-06-06 DIAGNOSIS — E78.5 HYPERLIPIDEMIA, UNSPECIFIED: ICD-10-CM

## 2022-06-06 DIAGNOSIS — I48.0 PAROXYSMAL ATRIAL FIBRILLATION: ICD-10-CM

## 2022-06-06 DIAGNOSIS — Z79.52 LONG TERM (CURRENT) USE OF SYSTEMIC STEROIDS: ICD-10-CM

## 2022-06-06 DIAGNOSIS — Z79.01 LONG TERM (CURRENT) USE OF ANTICOAGULANTS: ICD-10-CM

## 2022-06-06 DIAGNOSIS — M06.9 RHEUMATOID ARTHRITIS, UNSPECIFIED: ICD-10-CM

## 2022-06-06 DIAGNOSIS — Z79.84 LONG TERM (CURRENT) USE OF ORAL HYPOGLYCEMIC DRUGS: ICD-10-CM

## 2022-06-06 DIAGNOSIS — F12.90 CANNABIS USE, UNSPECIFIED, UNCOMPLICATED: ICD-10-CM

## 2022-06-06 DIAGNOSIS — I10 ESSENTIAL (PRIMARY) HYPERTENSION: ICD-10-CM

## 2022-06-06 DIAGNOSIS — Z87.891 PERSONAL HISTORY OF NICOTINE DEPENDENCE: ICD-10-CM

## 2022-06-06 DIAGNOSIS — R00.1 BRADYCARDIA, UNSPECIFIED: ICD-10-CM

## 2022-06-07 ENCOUNTER — APPOINTMENT (OUTPATIENT)
Dept: FAMILY MEDICINE | Facility: CLINIC | Age: 72
End: 2022-06-07
Payer: MEDICARE

## 2022-06-07 VITALS
OXYGEN SATURATION: 98 % | HEART RATE: 74 BPM | TEMPERATURE: 97.3 F | DIASTOLIC BLOOD PRESSURE: 78 MMHG | HEIGHT: 65 IN | RESPIRATION RATE: 16 BRPM | SYSTOLIC BLOOD PRESSURE: 134 MMHG | BODY MASS INDEX: 24.83 KG/M2 | WEIGHT: 149 LBS

## 2022-06-07 VITALS — SYSTOLIC BLOOD PRESSURE: 126 MMHG | HEART RATE: 72 BPM | DIASTOLIC BLOOD PRESSURE: 72 MMHG

## 2022-06-07 LAB
ALBUMIN SERPL ELPH-MCNC: 4.7 G/DL
ALP BLD-CCNC: 101 U/L
ALT SERPL-CCNC: 11 U/L
ANION GAP SERPL CALC-SCNC: 16 MMOL/L
AST SERPL-CCNC: 20 U/L
BASOPHILS # BLD AUTO: 0.07 K/UL
BASOPHILS NFR BLD AUTO: 0.8 %
BILIRUB SERPL-MCNC: 0.6 MG/DL
BUN SERPL-MCNC: 30 MG/DL
CALCIUM SERPL-MCNC: 9.9 MG/DL
CHLORIDE SERPL-SCNC: 103 MMOL/L
CHOLEST SERPL-MCNC: 141 MG/DL
CO2 SERPL-SCNC: 20 MMOL/L
CREAT SERPL-MCNC: 1.05 MG/DL
EGFR: 56 ML/MIN/1.73M2
EOSINOPHIL # BLD AUTO: 0.29 K/UL
EOSINOPHIL NFR BLD AUTO: 3.2 %
ESTIMATED AVERAGE GLUCOSE: 157 MG/DL
FOLATE SERPL-MCNC: >20 NG/ML
GLUCOSE SERPL-MCNC: 116 MG/DL
HBA1C MFR BLD HPLC: 7.1 %
HCT VFR BLD CALC: 36 %
HDLC SERPL-MCNC: 46 MG/DL
HGB BLD-MCNC: 11.6 G/DL
IMM GRANULOCYTES NFR BLD AUTO: 0.3 %
LDLC SERPL CALC-MCNC: 67 MG/DL
LYMPHOCYTES # BLD AUTO: 1.59 K/UL
LYMPHOCYTES NFR BLD AUTO: 17.3 %
MAN DIFF?: NORMAL
MCHC RBC-ENTMCNC: 29.9 PG
MCHC RBC-ENTMCNC: 32.2 GM/DL
MCV RBC AUTO: 92.8 FL
MONOCYTES # BLD AUTO: 0.32 K/UL
MONOCYTES NFR BLD AUTO: 3.5 %
NEUTROPHILS # BLD AUTO: 6.9 K/UL
NEUTROPHILS NFR BLD AUTO: 74.9 %
NONHDLC SERPL-MCNC: 95 MG/DL
PLATELET # BLD AUTO: 279 K/UL
POTASSIUM SERPL-SCNC: 5 MMOL/L
PROT SERPL-MCNC: 6.9 G/DL
RBC # BLD: 3.88 M/UL
RBC # FLD: 14.3 %
SODIUM SERPL-SCNC: 140 MMOL/L
TRIGL SERPL-MCNC: 142 MG/DL
VIT B12 SERPL-MCNC: 693 PG/ML
WBC # FLD AUTO: 9.2 K/UL

## 2022-06-07 PROCEDURE — 99496 TRANSJ CARE MGMT HIGH F2F 7D: CPT

## 2022-06-07 NOTE — REVIEW OF SYSTEMS
[Fatigue] : fatigue [Negative] : Musculoskeletal [Fever] : no fever [Night Sweats] : no night sweats [Headache] : no headache [Dizziness] : no dizziness [Confusion] : no confusion [Memory Loss] : no memory loss [FreeTextEntry2] : chronic

## 2022-06-07 NOTE — ASSESSMENT
[FreeTextEntry1] : Megan appears to well well compensated with VS normalized off HTN medications ( Irbesartan and Sotalol) . \par \par Advised re: hydration !\par Bring HOME BP cuff to Cardiology encounter later this week. \par \par Best wishes!

## 2022-06-07 NOTE — PHYSICAL EXAM
[No Acute Distress] : no acute distress [Normal Sclera/Conjunctiva] : normal sclera/conjunctiva [PERRL] : pupils equal round and reactive to light [Normal Oropharynx] : the oropharynx was normal [Supple] : supple [No Accessory Muscle Use] : no accessory muscle use [Clear to Auscultation] : lungs were clear to auscultation bilaterally [Normal Rate] : normal rate  [Regular Rhythm] : with a regular rhythm [No Joint Swelling] : no joint swelling [Alert and Oriented x3] : oriented to person, place, and time [Normal Insight/Judgement] : insight and judgment were intact [de-identified] : calm  [de-identified] : NC  AT   [de-identified] : HR  72  irregular ( P AFIB KNOWN)  [de-identified] : n [de-identified] : no pronator drift

## 2022-06-07 NOTE — HISTORY OF PRESENT ILLNESS
[Post-hospitalization from ___ Hospital] : Post-hospitalization from [unfilled] Hospital [Admitted on: ___] : The patient was admitted on [unfilled] [Discharged on ___] : discharged on [unfilled] [Radiology Findings] : radiology findings [FreeTextEntry2] : Accompanied by daughter, Rosa: \par the pt collapsed and hit her head on the floor ( witnessed by GC) on MAY 31 ; readily aroused.\par Went to ER and hr noted to be in 40's and BP low .\par CT of brain x 2 UNREMARKABLE.\par Pelvis X RAY no fracture \par CXR  no rib fx or pneumothorax\par \par Monitored overnight and discharged (6/1) with BP Meds  on HOLD. \par Since has been monitoring BP at home and reviewed for average /70  HR averages 70's \par \par Megna denies any H/A , confusion, nausea.; No pain of scalp \par \par

## 2022-06-08 ENCOUNTER — RX RENEWAL (OUTPATIENT)
Age: 72
End: 2022-06-08

## 2022-06-08 ENCOUNTER — NON-APPOINTMENT (OUTPATIENT)
Age: 72
End: 2022-06-08

## 2022-06-08 ENCOUNTER — APPOINTMENT (OUTPATIENT)
Dept: CARDIOLOGY | Facility: CLINIC | Age: 72
End: 2022-06-08
Payer: MEDICARE

## 2022-06-08 VITALS
HEART RATE: 114 BPM | HEIGHT: 65 IN | WEIGHT: 147.71 LBS | DIASTOLIC BLOOD PRESSURE: 80 MMHG | SYSTOLIC BLOOD PRESSURE: 160 MMHG | BODY MASS INDEX: 24.61 KG/M2 | OXYGEN SATURATION: 97 %

## 2022-06-08 PROCEDURE — 99214 OFFICE O/P EST MOD 30 MIN: CPT

## 2022-06-08 PROCEDURE — 93000 ELECTROCARDIOGRAM COMPLETE: CPT

## 2022-06-08 NOTE — HISTORY OF PRESENT ILLNESS
[FreeTextEntry1] : 72-year-old female with a history of paroxysmal atrial fibrillation, nonobstructive CAD, hypertension, hypercholesterolemia, and type 2 diabetes.    Last week she collapsed (did not lose consciousness) and was taken to an emergency room where she was said to have a slow heart rate and low blood pressure.  She was told to hold her irbesartan and sotalol until she was seen here.  Since that time she has been in and out of atrial fibrillation.  Her blood pressures are averaging about 140/80.\par \par Her most recent blood work shows an LDL cholesterol of 67 and an A1c of 7.1.

## 2022-06-08 NOTE — ASSESSMENT
[FreeTextEntry1] : Ms Villavicencio has been in and out of atrial fibrillation since discontinuation of her sotalol last week.  Her exam shows a heart rate of about 130, clear lungs, normal blood pressure, and an otherwise normal cardiac exam.  Her EKG confirms that she is in atrial fibrillation.\par \par It is unclear if she collapsed last week because of slow heart rate or low blood pressure.  She will restart her sotalol 80 mg twice daily and return in a week.  After this she will be monitored to see if she is becoming significantly bradycardic and a determination will be made about restarting her antihypertensive therapy
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
## 2022-06-10 DIAGNOSIS — R00.1 BRADYCARDIA, UNSPECIFIED: ICD-10-CM

## 2022-06-10 DIAGNOSIS — R42 DIZZINESS AND GIDDINESS: ICD-10-CM

## 2022-06-15 ENCOUNTER — APPOINTMENT (OUTPATIENT)
Dept: CARDIOLOGY | Facility: CLINIC | Age: 72
End: 2022-06-15

## 2022-06-15 VITALS
WEIGHT: 148 LBS | HEART RATE: 52 BPM | DIASTOLIC BLOOD PRESSURE: 62 MMHG | BODY MASS INDEX: 24.63 KG/M2 | OXYGEN SATURATION: 100 % | SYSTOLIC BLOOD PRESSURE: 130 MMHG

## 2022-06-15 DIAGNOSIS — R55 SYNCOPE AND COLLAPSE: ICD-10-CM

## 2022-06-15 PROCEDURE — 99214 OFFICE O/P EST MOD 30 MIN: CPT | Mod: 25

## 2022-06-15 PROCEDURE — 93224 XTRNL ECG REC UP TO 48 HRS: CPT

## 2022-06-15 NOTE — DISCUSSION/SUMMARY
[FreeTextEntry1] : Ms Villavicencio has not had any near syncopal episodes during the past week back on sotalol 80 twice daily.  Her exam shows a regular bradycardia at a rate of about 50 and normal blood pressure.\par \par Although her device indicates that she is in atrial fibrillation at times her heart rate is never rapid and since she she is typically about 140 while in atrial fibrillation I think it is unlikely that she really is having episodes of atrial fibrillation.\par \par She will be monitored to see how slow her heart rates And to see if any atrial fibrillation is still present.  If the monitoring is satisfactory she will remain on sotalol 80 twice daily alone.

## 2022-06-15 NOTE — HISTORY OF PRESENT ILLNESS
[FreeTextEntry1] : 72-year-old female with a history of paroxysmal atrial fibrillation and hypertension who is being seen in follow-up after a recent syncopal episode.  She was seen last week and her sotalol was restarted.  She has been taking her heart rate and blood pressure since.  Her blood pressures have been satisfactory off irbesartan.  Her heart rates are averaging about 50 and sometimes her monitor think she is in atrial fibrillation.

## 2022-07-11 ENCOUNTER — FORM ENCOUNTER (OUTPATIENT)
Age: 72
End: 2022-07-11

## 2022-07-12 ENCOUNTER — APPOINTMENT (OUTPATIENT)
Dept: INTERNAL MEDICINE | Facility: CLINIC | Age: 72
End: 2022-07-12

## 2022-07-12 ENCOUNTER — TRANSCRIPTION ENCOUNTER (OUTPATIENT)
Age: 72
End: 2022-07-12

## 2022-07-12 DIAGNOSIS — U07.1 COVID-19: ICD-10-CM

## 2022-07-12 PROCEDURE — 99202 OFFICE O/P NEW SF 15 MIN: CPT | Mod: 95

## 2022-07-12 PROCEDURE — 99212 OFFICE O/P EST SF 10 MIN: CPT | Mod: 95

## 2022-07-12 NOTE — HISTORY OF PRESENT ILLNESS
[Home] : at home, [unfilled] , at the time of the visit. [Medical Office: ([facility])___] : at the medical office located in  [Family Member] : family member [Verbal consent obtained from patient] : the patient, [unfilled] [FreeTextEntry8] : 73 yo F with h/o DM, PAF, HTN, HLD, RA, \par to [facility] care for acute visit - tested positive for COVID today.\par Daughter with patient during visit\par \par Started to feel unwell yesterday, sore throat, sinus congestion.  Woke up this morning with additional sx of fever, malaise, myalgias, HA, PND, nausea with one episode of emesis. Slight cough. No SOB, no CP, no palpitations, no diarrhea, no loss of taste/smell. \par Took home COVID test which came out pos. - they are concerned due to her multiple medical issues including autoimmune dz with current use of immunosuppressive medication.

## 2022-07-12 NOTE — ASSESSMENT
[FreeTextEntry1] : COVID 19 infection in high risk patient - with h/o RA and immunosuppressive tx, DM, PAF, HTN, HLD.\par Not a candidate for Paxlovid due to use of Xarelto. Discussed risk v benefit of mAb infusions - she would like to proceed. Reviewed and filled out form together and submitted. Advised to call if they are not contacted within 24 hours.\par Reviewed supportive treatment for sx she is currently having and potential sx's,\par - reviewed red flags that would  necessitate visit to ED. \par call for any new or worsening sx, ED if red flags occur.

## 2022-07-12 NOTE — PHYSICAL EXAM
[No Acute Distress] : no acute distress [No Respiratory Distress] : no respiratory distress  [No Accessory Muscle Use] : no accessory muscle use [No Rash] : no rash

## 2022-07-13 ENCOUNTER — NON-APPOINTMENT (OUTPATIENT)
Age: 72
End: 2022-07-13

## 2022-07-14 ENCOUNTER — APPOINTMENT (OUTPATIENT)
Dept: DISASTER EMERGENCY | Facility: HOSPITAL | Age: 72
End: 2022-07-14

## 2022-07-14 ENCOUNTER — NON-APPOINTMENT (OUTPATIENT)
Age: 72
End: 2022-07-14

## 2022-07-14 ENCOUNTER — OUTPATIENT (OUTPATIENT)
Dept: OUTPATIENT SERVICES | Facility: HOSPITAL | Age: 72
LOS: 1 days | End: 2022-07-14

## 2022-07-14 VITALS
TEMPERATURE: 99 F | SYSTOLIC BLOOD PRESSURE: 119 MMHG | DIASTOLIC BLOOD PRESSURE: 59 MMHG | OXYGEN SATURATION: 99 % | RESPIRATION RATE: 17 BRPM | HEART RATE: 96 BPM

## 2022-07-14 VITALS
HEART RATE: 97 BPM | HEIGHT: 65 IN | OXYGEN SATURATION: 96 % | SYSTOLIC BLOOD PRESSURE: 140 MMHG | DIASTOLIC BLOOD PRESSURE: 81 MMHG | RESPIRATION RATE: 17 BRPM | WEIGHT: 143.3 LBS | TEMPERATURE: 99 F

## 2022-07-14 DIAGNOSIS — U07.1 COVID-19: ICD-10-CM

## 2022-07-14 RX ORDER — BEBTELOVIMAB 87.5 MG/ML
175 INJECTION, SOLUTION INTRAVENOUS ONCE
Refills: 0 | Status: COMPLETED | OUTPATIENT
Start: 2022-07-14 | End: 2022-07-14

## 2022-07-14 RX ADMIN — BEBTELOVIMAB 175 MILLIGRAM(S): 87.5 INJECTION, SOLUTION INTRAVENOUS at 15:40

## 2022-07-14 NOTE — CHART NOTE - NSCHARTNOTEFT_GEN_A_CORE
CC: Monoclonal Antibody Infusion/COVID 19 Positive  72y Female with PMHx of htn, DM, RA and recent dx of COVID 19+ who presents today for elective Bebtelovimab. Patient has been screened and was deemed to be a candidate.    Symptoms/ Criteria  Date of Symptom Onset: 7/11/22  Date of Positive COVID PCR: 7/11/22  Risk Profile includes:       Vaccination Status: Pfizer x4    PMHx:  Infection due to severe acute respiratory syndrome coronavirus 2 (SARS-CoV-2)    SysAdmin_VisitLink        Exam/findings:  T(C): 37.1 (07-14-22 @ 15:15), Max: 37.1 (07-14-22 @ 15:15)  HR: 97 (07-14-22 @ 15:15) (97 - 97)  BP: 140/81 (07-14-22 @ 15:15) (140/81 - 140/81)  RR: 17 (07-14-22 @ 15:15) (17 - 17)  SpO2: 96% (07-14-22 @ 15:15) (96% - 96%)    PE:   Appearance: NAD	  HEENT:  NC/AT  Cardiovascular:  No edema  Respiratory: no use of accessory muscles  Gastrointestinal:  non-distended   Skin: warm and dry  Neurologic: Non-focal  Extremities: Normal range of motion    ASSESSMENT:  Pt is COVID positive with mild to moderate symptoms who was referred for elective MAB (Bebtelovimab).    PLAN:  - MAB treatment explained to patient. I have reviewed the Bebtelovimab Emergency Use Authorization (EUA) and I have provided the patient or patient's caregiver with the following information:   1. FDA has authorized emergency use of Bebtelovimab to be administered for the treatment of mild to moderate COVID-19, which is not an FDA-approved biological product.   2. The patient or patient's caregiver has the option to accept or refuse administration of MAB.   3. The significant known and potential risks and benefits of Bebtelovimab and the extent to which such risks and benefits are unknown.  4. Information on available alternative treatments and risks and benefits of those alternatives.  - Patient verbalized understanding of plan and agrees to treatment. All questions answered.  - Consent for MAB obtained.   - 175mg of Bebtelovimab administered as a single intravenous injection over at least 30 seconds.   - Observe patient for one hour post medication administration and then if stable, discharge home with outpatient follow up as planned by PCP.      POST ASSESSMENT:   Patient completed MAB, and monitored x 1 hour post-infusion with no adverse reactions noted, remained hemodynamically stable.  - Patient tolerated infusion well; denies complaints of chest pain/SOB/dizziness/palpitations.   - VSS for discharge home.  - D/C instructions given/ fact sheet included.  - Patient was instructed to self-isolate and use infection control measures (e.g wear mask, isolate, social distance, avoid sharing personal items, clean and disinfect "high touch" surfaces, and frequent handwashing according to the CDC guidelines.   - The patient was informed on what symptoms to be aware of for the next couple of days, and if there are any issues to call the 24/7 clinical call center. Patient was instructed to follow up with primary care provider as needed.

## 2022-07-15 ENCOUNTER — APPOINTMENT (OUTPATIENT)
Dept: FAMILY MEDICINE | Facility: CLINIC | Age: 72
End: 2022-07-15

## 2022-07-18 ENCOUNTER — NON-APPOINTMENT (OUTPATIENT)
Age: 72
End: 2022-07-18

## 2022-07-29 ENCOUNTER — TRANSCRIPTION ENCOUNTER (OUTPATIENT)
Age: 72
End: 2022-07-29

## 2022-08-05 ENCOUNTER — TRANSCRIPTION ENCOUNTER (OUTPATIENT)
Age: 72
End: 2022-08-05

## 2022-08-11 LAB
ALBUMIN SERPL ELPH-MCNC: 4.7 G/DL
ALP BLD-CCNC: 93 U/L
ALT SERPL-CCNC: 19 U/L
ANION GAP SERPL CALC-SCNC: 13 MMOL/L
AST SERPL-CCNC: 27 U/L
BASOPHILS # BLD AUTO: 0.03 K/UL
BASOPHILS NFR BLD AUTO: 0.4 %
BILIRUB SERPL-MCNC: 0.5 MG/DL
BUN SERPL-MCNC: 14 MG/DL
CALCIUM SERPL-MCNC: 10.2 MG/DL
CHLORIDE SERPL-SCNC: 102 MMOL/L
CHOLEST SERPL-MCNC: 143 MG/DL
CO2 SERPL-SCNC: 26 MMOL/L
CREAT SERPL-MCNC: 0.87 MG/DL
CREAT SPEC-SCNC: 95 MG/DL
EGFR: 70 ML/MIN/1.73M2
EOSINOPHIL # BLD AUTO: 0.28 K/UL
EOSINOPHIL NFR BLD AUTO: 4.2 %
ESTIMATED AVERAGE GLUCOSE: 128 MG/DL
GLUCOSE SERPL-MCNC: 120 MG/DL
HBA1C MFR BLD HPLC: 6.1 %
HCT VFR BLD CALC: 42.3 %
HDLC SERPL-MCNC: 46 MG/DL
HGB BLD-MCNC: 13.4 G/DL
IMM GRANULOCYTES NFR BLD AUTO: 0.3 %
LDLC SERPL CALC-MCNC: 60 MG/DL
LDLC SERPL DIRECT ASSAY-MCNC: 66 MG/DL
LYMPHOCYTES # BLD AUTO: 1.34 K/UL
LYMPHOCYTES NFR BLD AUTO: 20 %
MAN DIFF?: NORMAL
MCHC RBC-ENTMCNC: 29.8 PG
MCHC RBC-ENTMCNC: 31.7 GM/DL
MCV RBC AUTO: 94.2 FL
MICROALBUMIN 24H UR DL<=1MG/L-MCNC: 2.6 MG/DL
MICROALBUMIN/CREAT 24H UR-RTO: 27 MG/G
MONOCYTES # BLD AUTO: 0.19 K/UL
MONOCYTES NFR BLD AUTO: 2.8 %
NEUTROPHILS # BLD AUTO: 4.83 K/UL
NEUTROPHILS NFR BLD AUTO: 72.3 %
NONHDLC SERPL-MCNC: 97 MG/DL
PLATELET # BLD AUTO: 181 K/UL
POTASSIUM SERPL-SCNC: 4.6 MMOL/L
PROT SERPL-MCNC: 7.2 G/DL
RBC # BLD: 4.49 M/UL
RBC # FLD: 13.6 %
SODIUM SERPL-SCNC: 141 MMOL/L
T3 SERPL-MCNC: 124 NG/DL
T4 FREE SERPL-MCNC: 1.4 NG/DL
TRIGL SERPL-MCNC: 185 MG/DL
TSH SERPL-ACNC: 0.35 UIU/ML
WBC # FLD AUTO: 6.69 K/UL

## 2022-08-16 ENCOUNTER — APPOINTMENT (OUTPATIENT)
Dept: ENDOCRINOLOGY | Facility: CLINIC | Age: 72
End: 2022-08-16

## 2022-08-16 VITALS
OXYGEN SATURATION: 98 % | HEART RATE: 60 BPM | SYSTOLIC BLOOD PRESSURE: 124 MMHG | HEIGHT: 65 IN | BODY MASS INDEX: 23.91 KG/M2 | WEIGHT: 143.5 LBS | RESPIRATION RATE: 16 BRPM | TEMPERATURE: 97.2 F | DIASTOLIC BLOOD PRESSURE: 80 MMHG

## 2022-08-16 PROCEDURE — 99214 OFFICE O/P EST MOD 30 MIN: CPT

## 2022-08-16 RX ORDER — IRBESARTAN 300 MG/1
300 TABLET ORAL
Qty: 90 | Refills: 1 | Status: DISCONTINUED | COMMUNITY
Start: 2021-05-11 | End: 2022-08-16

## 2022-08-16 NOTE — ASSESSMENT
[FreeTextEntry1] : 70 yo woman with diagnoses of DM2, HTN, HLD and overweight and also hypoT and Afib. \par She has neuropathy. She states she has malabsorption.Memory impairment. \par She tried MF IR and severe diarrhea. \par \par DM2 : a1c 6.1. \par she has diarrhea with Mf. \par MF  mg qd and continue jardiance 10 mg qd  \par discussed diet and exercise\par hammer toes: followup with podiatry \par continue ASA \par evelyn/c ratio normal. \par diabetic  neuropathy: continue with B12 supplement \par encouraged more exercise walking 30 min 3 x week\par \par HypoT / Hashimoto's \par cont Lt4 88 mcg qd and take 1/2 pill on Sundays \par \par HTN: bp at target on meds. Continue current management.\par I advised low fat/low cholesterol diet, low salt diet, and weight loss\par \par HLD:  lipids very good. cont statin  \par low fat/low cholesterol diet \par \par OverweighT : lost 5 lbs due to lack of appetite. discussed diet and exercise\par encouraged more exercise walking 30 min 3 x week\par \par

## 2022-08-30 ENCOUNTER — NON-APPOINTMENT (OUTPATIENT)
Age: 72
End: 2022-08-30

## 2022-09-02 ENCOUNTER — NON-APPOINTMENT (OUTPATIENT)
Age: 72
End: 2022-09-02

## 2022-09-19 ENCOUNTER — TRANSCRIPTION ENCOUNTER (OUTPATIENT)
Age: 72
End: 2022-09-19

## 2022-09-26 ENCOUNTER — APPOINTMENT (OUTPATIENT)
Dept: RHEUMATOLOGY | Facility: CLINIC | Age: 72
End: 2022-09-26

## 2022-11-07 ENCOUNTER — NON-APPOINTMENT (OUTPATIENT)
Age: 72
End: 2022-11-07

## 2022-11-07 ENCOUNTER — APPOINTMENT (OUTPATIENT)
Dept: CARDIOLOGY | Facility: CLINIC | Age: 72
End: 2022-11-07

## 2022-11-07 VITALS — SYSTOLIC BLOOD PRESSURE: 135 MMHG | DIASTOLIC BLOOD PRESSURE: 65 MMHG

## 2022-11-07 VITALS
DIASTOLIC BLOOD PRESSURE: 74 MMHG | OXYGEN SATURATION: 97 % | WEIGHT: 141 LBS | HEART RATE: 47 BPM | BODY MASS INDEX: 23.46 KG/M2 | SYSTOLIC BLOOD PRESSURE: 151 MMHG

## 2022-11-07 PROCEDURE — 93000 ELECTROCARDIOGRAM COMPLETE: CPT

## 2022-11-07 PROCEDURE — 99214 OFFICE O/P EST MOD 30 MIN: CPT

## 2022-11-07 NOTE — DISCUSSION/SUMMARY
[FreeTextEntry1] : Ms Villavicencio is feeling generally well and looks unchanged.  Her exam shows no change in her weight, normal blood pressure, clear lungs, and a normal cardiac exam.  An EKG shows sinus bradycardia.  Her QT interval is 482 ms.\par \par She is doing well at present and will continue on sotalol 80 twice daily, Xarelto 20, rosuvastatin, metformin, and Jardiance.  She will follow-up in 6 months. [EKG obtained to assist in diagnosis and management of assessed problem(s)] : EKG obtained to assist in diagnosis and management of assessed problem(s)

## 2022-11-07 NOTE — HISTORY OF PRESENT ILLNESS
Yoselyn Cisneros is a 57 year old female here for  Chief Complaint   Patient presents with   • Cancer     Denies latex allergy or sensitivity.    Medication verified, no changes.  PCP and Pharmacy verified.    Social History     Tobacco Use   Smoking Status Never Smoker   Smokeless Tobacco Never Used     Advance Directives Filed: No    ECOG:   ECOG   ECOG Performance Status        Vitals:    There were no vitals taken for this visit.    These vital signs are:  Within defined parameters (Per Reference \"Defined Limits Hospital Outpatient Department (HOD)\")    Height: No.  Ht Readings from Last 1 Encounters:   10/05/21 5' 2.5\" (1.588 m)     Weight:Yes, shoes on.  Wt Readings from Last 3 Encounters:   10/07/21 88.1 kg (194 lb 3.6 oz)   09/29/21 87.6 kg (193 lb 3.7 oz)   09/24/21 88 kg (194 lb 0.1 oz)       BMI: There is no height or weight on file to calculate BMI.    REVIEW OF SYSTEMS  GENERAL:  Patient denies headache, fevers, chills, night sweats, excessive fatigue, change in appetite, weight loss, dizziness  ALLERGIC/IMMUNOLOGIC: Verified allergies: Yes  EYES:  Patient denies significant visual difficulties, double vision, blurred vision  ENT/MOUTH: Patient denies problems with hearing, sore throat, sinus drainage, mouth sores  ENDOCRINE:  Patient denies diabetes, hormone replacement, hot flashes, but complains of: thyroid disease  HEMATOLOGIC/LYMPHATIC: Patient denies easy bruising, bleeding, tender lymph nodes, swollen lymph nodes  BREASTS: Patient denies abnormal masses of breast, nipple discharge, pain  RESPIRATORY:  Patient denies lung pain with breathing, cough, coughing up blood, shortness of breath  CARDIOVASCULAR:  Patient denies anginal chest pain, palpitations, shortness of breath when lying flat, peripheral edema  GASTROINTESTINAL: Patient denies abdominal pain , nausea, vomiting, diarrhea, GI bleeding, constipation, change in bowel habits, heartburn, sensation of feeling full, difficulty swallowing  :  Patient denies abnormal genital masses, blood in the urine, frequency, urgency, burning with urination, hesitancy, incontinence, vaginal bleeding, discharge  MUSCULOSKELETAL:  Patient denies joint pain, bone pain, joint swelling, redness, decreased range of motion  SKIN:  Patient denies inflammation, ulcerations, skin changes, itching, but complains of: chronic rashes groain  NEUROLOGIC:  Patient denies loss of balance, areas of focal weakness, abnormal gait, sensory problems, numbness, tingling  PSYCHIATRIC: Patient denies insomnia, depression, anxiety    This patient reported abnormal symptoms that needed immediate verbal communication: No   [FreeTextEntry1] : 72-year-old female with a history of paroxysmal atrial fibrillation, coronary disease, diabetes, hypertension, hypercholesterolemia.  She was last seen about 6 months ago.  Monitoring at that time for a week she was in atrial fibrillation about 5% of the time.  She is feeling generally well.  She is no longer having any orthostatic symptoms since irbesartan was discontinued.  She gets very sporadic palpitations.  Her most recent blood work showed an A1c of 6.1 and an LDL cholesterol of 60 indicating both are well controlled.\par \par

## 2022-11-09 ENCOUNTER — TRANSCRIPTION ENCOUNTER (OUTPATIENT)
Age: 72
End: 2022-11-09

## 2022-11-15 ENCOUNTER — TRANSCRIPTION ENCOUNTER (OUTPATIENT)
Age: 72
End: 2022-11-15

## 2022-11-21 LAB
ALBUMIN SERPL ELPH-MCNC: 4.8 G/DL
ALP BLD-CCNC: 83 U/L
ALT SERPL-CCNC: 16 U/L
ANION GAP SERPL CALC-SCNC: 14 MMOL/L
AST SERPL-CCNC: 26 U/L
BASOPHILS # BLD AUTO: 0.08 K/UL
BASOPHILS NFR BLD AUTO: 1 %
BILIRUB SERPL-MCNC: 0.6 MG/DL
BUN SERPL-MCNC: 17 MG/DL
CALCIUM SERPL-MCNC: 9.9 MG/DL
CHLORIDE SERPL-SCNC: 104 MMOL/L
CHOLEST SERPL-MCNC: 160 MG/DL
CO2 SERPL-SCNC: 22 MMOL/L
CREAT SERPL-MCNC: 0.85 MG/DL
CREAT SPEC-SCNC: 91 MG/DL
EGFR: 72 ML/MIN/1.73M2
EOSINOPHIL # BLD AUTO: 0.25 K/UL
EOSINOPHIL NFR BLD AUTO: 3.3 %
ESTIMATED AVERAGE GLUCOSE: 137 MG/DL
GLUCOSE SERPL-MCNC: 139 MG/DL
HBA1C MFR BLD HPLC: 6.4 %
HCT VFR BLD CALC: 45.1 %
HDLC SERPL-MCNC: 57 MG/DL
HGB BLD-MCNC: 13.7 G/DL
IMM GRANULOCYTES NFR BLD AUTO: 0.3 %
LDLC SERPL CALC-MCNC: 72 MG/DL
LDLC SERPL DIRECT ASSAY-MCNC: 74 MG/DL
LYMPHOCYTES # BLD AUTO: 1.17 K/UL
LYMPHOCYTES NFR BLD AUTO: 15.3 %
MAN DIFF?: NORMAL
MCHC RBC-ENTMCNC: 28.7 PG
MCHC RBC-ENTMCNC: 30.4 GM/DL
MCV RBC AUTO: 94.4 FL
MICROALBUMIN 24H UR DL<=1MG/L-MCNC: 1.5 MG/DL
MICROALBUMIN/CREAT 24H UR-RTO: 16 MG/G
MONOCYTES # BLD AUTO: 0.32 K/UL
MONOCYTES NFR BLD AUTO: 4.2 %
NEUTROPHILS # BLD AUTO: 5.79 K/UL
NEUTROPHILS NFR BLD AUTO: 75.9 %
NONHDLC SERPL-MCNC: 103 MG/DL
PLATELET # BLD AUTO: 273 K/UL
POTASSIUM SERPL-SCNC: 4.4 MMOL/L
PROT SERPL-MCNC: 7 G/DL
RBC # BLD: 4.78 M/UL
RBC # FLD: 15.5 %
SODIUM SERPL-SCNC: 140 MMOL/L
T4 FREE SERPL-MCNC: 1.7 NG/DL
TRIGL SERPL-MCNC: 156 MG/DL
TSH SERPL-ACNC: 1.06 UIU/ML
WBC # FLD AUTO: 7.63 K/UL

## 2022-12-15 ENCOUNTER — NON-APPOINTMENT (OUTPATIENT)
Age: 72
End: 2022-12-15

## 2022-12-19 ENCOUNTER — NON-APPOINTMENT (OUTPATIENT)
Age: 72
End: 2022-12-19

## 2022-12-20 ENCOUNTER — APPOINTMENT (OUTPATIENT)
Dept: ENDOCRINOLOGY | Facility: CLINIC | Age: 72
End: 2022-12-20

## 2022-12-20 VITALS
BODY MASS INDEX: 24.55 KG/M2 | TEMPERATURE: 97.6 F | SYSTOLIC BLOOD PRESSURE: 110 MMHG | HEIGHT: 65 IN | HEART RATE: 57 BPM | RESPIRATION RATE: 16 BRPM | WEIGHT: 147.38 LBS | OXYGEN SATURATION: 98 % | DIASTOLIC BLOOD PRESSURE: 70 MMHG

## 2022-12-20 PROCEDURE — 99214 OFFICE O/P EST MOD 30 MIN: CPT

## 2022-12-20 RX ORDER — LORATADINE 10 MG/1
10 TABLET ORAL
Qty: 30 | Refills: 9 | Status: DISCONTINUED | COMMUNITY
Start: 2021-05-11 | End: 2022-12-20

## 2022-12-20 NOTE — ASSESSMENT
[Carbohydrate Consistent Diet] : carbohydrate consistent diet [Exercise/Effect on Glucose] : exercise/effect on glucose [FreeTextEntry1] : 70 yo woman with diagnoses of DM2, HTN, HLD and overweight and also hypoT and Afib. \par She has neuropathy. She states she has malabsorption.Memory impairment. \par She tried MF IR and severe diarrhea. \par \par DM2 : a1c 6.4\par cont MF   mg qd and  jardiance 10 mg qd  \par discussed diet and exercise\par evelyn/c ratio normal. \par diabetic  neuropathy: continue with B12 supplement \par encouraged more exercise walking 30 min 3 x week and she is doing it \par \par HypoT / Hashimoto's \par tfts normal. \par cont Lt4 88 mcg qd and take 1/2 pill on Sundays \par \par HTN: bp at target on meds. Continue current management.\par I advised low fat/low cholesterol diet, low salt diet,\par \par HLD:  lipids very good. cont statin  \par \par OverweighT : discussed diet and exercise\par encouraged more exercise walking 30 min 3 x week\par \par

## 2023-01-27 ENCOUNTER — APPOINTMENT (OUTPATIENT)
Dept: RHEUMATOLOGY | Facility: CLINIC | Age: 73
End: 2023-01-27
Payer: MEDICARE

## 2023-01-27 VITALS
TEMPERATURE: 96.5 F | BODY MASS INDEX: 22.99 KG/M2 | DIASTOLIC BLOOD PRESSURE: 73 MMHG | WEIGHT: 138 LBS | HEIGHT: 65 IN | SYSTOLIC BLOOD PRESSURE: 133 MMHG | HEART RATE: 57 BPM | OXYGEN SATURATION: 95 %

## 2023-01-27 DIAGNOSIS — Z13.820 ENCOUNTER FOR SCREENING FOR OSTEOPOROSIS: ICD-10-CM

## 2023-01-27 PROCEDURE — 99204 OFFICE O/P NEW MOD 45 MIN: CPT | Mod: 25

## 2023-01-27 NOTE — ASSESSMENT
[FreeTextEntry1] : 71 y/o female with T2DM, hypothyroidism, HTN, HLD, neuropathy, AFib on Xarelto referred to rheumatology for RA. \par Pt was diagnosed with RA in 2014 with joint inflammation of hands, wrists, shoulders, neck. Pt was treated with steroids but developed diabetes. Pt has been on MTX for a long time with resolution of her RA flares. Pt only reports R 3rd Randy's nodes as a long term consequence of RA.\par Pt’s prior rheumatologist Dr. De La Fuente in [place] ([facility]) no longer takes her insurance. Last seen by Dr. De La Fuente on 9/2022. Pt is on MTX 7 tabs/week with daily folic acid.\par Pt has never had DXA scan done. Denies fractures.\par \par Pt has clear Hx of RA well controlled with MTX for almost 10 years. Pt should be evaluated for osteoporosis as general screening for her age as well as increased risk with Hx of RA and long term steroid use\par \par - Obtain labwork for medication safety and RA activity. Pt to obtain repeat safety labs 3 months from now\par - Will renew MTX 7 tabs/week and daily folic acid when safety labs return. Side effects of MTX were discussed with the patient in detail including but not limited to oral ulcers, alopecia, elevated LFTs, abdominal discomfort, pneumonitis, and cytopenias. This is a high-risk therapy requiring intense monitoring for toxicity. Will evaluate with frequent monitoring of CBC and LFTs. Advised patient to take folic acid daily to prevent complications of MTX.\par Counseled to minimize alcohol intake (no more than 3 drinks/week).\par - Obtain DXA to screen for osteoporosis\par - Will call pt with results of DXA scan. If pt has osteoporosis, will advise pt to make follow up for more detailed discussion. RTC 6 months for follow up\par

## 2023-01-27 NOTE — HISTORY OF PRESENT ILLNESS
[FreeTextEntry1] : 71 y/o female with T2DM, hypothyroidism, HTN, HLD, neuropathy, AFib on Xarelto referred to rheumatology for RA. \par Pt was diagnosed with RA in 2014 with joint inflammation of hands, wrists, shoulders, neck. Pt was treated with steroids but developed diabetes. Pt has been on MTX for a long time with resolution of her RA flares. Pt only reports R 3rd Randy's nodes as a long term consequence of RA.\par Pt’s prior rheumatologist Dr. De La Fuente in [place] ([facility]) no longer takes her insurance. Last seen by Dr. De La Fuente on 9/2022. Pt is on MTX 7 tabs/week with daily folic acid.\par Pt has never had DXA scan done. Denies fractures.

## 2023-01-28 LAB
ALBUMIN SERPL ELPH-MCNC: 4.6 G/DL
ALP BLD-CCNC: 90 U/L
ALT SERPL-CCNC: 12 U/L
ANION GAP SERPL CALC-SCNC: 13 MMOL/L
AST SERPL-CCNC: 22 U/L
BASOPHILS # BLD AUTO: 0.06 K/UL
BASOPHILS NFR BLD AUTO: 0.9 %
BILIRUB SERPL-MCNC: 0.3 MG/DL
BUN SERPL-MCNC: 23 MG/DL
CALCIUM SERPL-MCNC: 9.8 MG/DL
CHLORIDE SERPL-SCNC: 103 MMOL/L
CO2 SERPL-SCNC: 25 MMOL/L
CREAT SERPL-MCNC: 0.96 MG/DL
CRP SERPL-MCNC: 3 MG/L
EGFR: 62 ML/MIN/1.73M2
EOSINOPHIL # BLD AUTO: 0.25 K/UL
EOSINOPHIL NFR BLD AUTO: 3.6 %
ERYTHROCYTE [SEDIMENTATION RATE] IN BLOOD BY WESTERGREN METHOD: 21 MM/HR
GLUCOSE SERPL-MCNC: 176 MG/DL
HBV CORE IGG+IGM SER QL: NONREACTIVE
HBV SURFACE AB SER QL: NONREACTIVE
HBV SURFACE AG SER QL: NONREACTIVE
HCT VFR BLD CALC: 42 %
HCV AB SER QL: NONREACTIVE
HCV S/CO RATIO: 0.06 S/CO
HGB BLD-MCNC: 13.4 G/DL
IMM GRANULOCYTES NFR BLD AUTO: 0.1 %
LYMPHOCYTES # BLD AUTO: 1.24 K/UL
LYMPHOCYTES NFR BLD AUTO: 17.9 %
MAN DIFF?: NORMAL
MCHC RBC-ENTMCNC: 29.2 PG
MCHC RBC-ENTMCNC: 31.9 GM/DL
MCV RBC AUTO: 91.5 FL
MONOCYTES # BLD AUTO: 0.27 K/UL
MONOCYTES NFR BLD AUTO: 3.9 %
NEUTROPHILS # BLD AUTO: 5.1 K/UL
NEUTROPHILS NFR BLD AUTO: 73.6 %
PLATELET # BLD AUTO: 236 K/UL
POTASSIUM SERPL-SCNC: 4.5 MMOL/L
PROT SERPL-MCNC: 6.6 G/DL
RBC # BLD: 4.59 M/UL
RBC # FLD: 14.7 %
SODIUM SERPL-SCNC: 141 MMOL/L
WBC # FLD AUTO: 6.93 K/UL

## 2023-01-30 ENCOUNTER — TRANSCRIPTION ENCOUNTER (OUTPATIENT)
Age: 73
End: 2023-01-30

## 2023-02-01 LAB
M TB IFN-G BLD-IMP: NEGATIVE
QUANTIFERON TB PLUS MITOGEN MINUS NIL: 8.18 IU/ML
QUANTIFERON TB PLUS NIL: 0.01 IU/ML
QUANTIFERON TB PLUS TB1 MINUS NIL: 0 IU/ML
QUANTIFERON TB PLUS TB2 MINUS NIL: 0 IU/ML

## 2023-03-07 ENCOUNTER — LABORATORY RESULT (OUTPATIENT)
Age: 73
End: 2023-03-07

## 2023-03-15 ENCOUNTER — RX RENEWAL (OUTPATIENT)
Age: 73
End: 2023-03-15

## 2023-03-22 ENCOUNTER — APPOINTMENT (OUTPATIENT)
Dept: ENDOCRINOLOGY | Facility: CLINIC | Age: 73
End: 2023-03-22
Payer: MEDICARE

## 2023-03-22 VITALS
HEART RATE: 101 BPM | SYSTOLIC BLOOD PRESSURE: 130 MMHG | DIASTOLIC BLOOD PRESSURE: 70 MMHG | BODY MASS INDEX: 22.99 KG/M2 | HEIGHT: 65 IN | RESPIRATION RATE: 15 BRPM | OXYGEN SATURATION: 98 % | WEIGHT: 138 LBS

## 2023-03-22 PROCEDURE — 99214 OFFICE O/P EST MOD 30 MIN: CPT

## 2023-03-22 NOTE — ASSESSMENT
[Exercise/Effect on Glucose] : exercise/effect on glucose [Self Monitoring of Blood Glucose] : self monitoring of blood glucose [Retinopathy Screening] : Patient was referred to ophthalmology for retinopathy screening [FreeTextEntry1] : 70 yo woman with diagnoses of DM2, HTN, HLD and overweight and also hypoT and Afib. \par She has neuropathy. She states she has malabsorption.Memory impairment. \par She tried MF IR and severe diarrhea. \par \par DM2 : a1c 6.5\par cont MF  mg qd and  jardiance 10 mg qd  \par discussed diet and exercise\par evelyn/c ratio normal. \par diabetic  neuropathy: continue with B12 supplement \par encouraged more exercise walking 30 min 3 x week \par \par HypoT / Hashimoto's \par tfts normal. cont Lt4 88 mcg qd and take 1/2 pill on Sundays \par \par HTN: bp at target on meds. Continue current management.\par I advised low fat/low cholesterol diet, low salt diet,\par \par HLD:  lipids very good. cont statin  > Avoid fried foods. \par \par OverweighT : discussed diet and exercise\par encouraged more exercise walking 30 min 3 x week\par \par

## 2023-04-05 ENCOUNTER — APPOINTMENT (OUTPATIENT)
Dept: MAMMOGRAPHY | Facility: CLINIC | Age: 73
End: 2023-04-05
Payer: MEDICARE

## 2023-04-05 ENCOUNTER — APPOINTMENT (OUTPATIENT)
Dept: RADIOLOGY | Facility: CLINIC | Age: 73
End: 2023-04-05

## 2023-04-05 PROCEDURE — 77085 DXA BONE DENSITY AXL VRT FX: CPT

## 2023-04-05 PROCEDURE — 77067 SCR MAMMO BI INCL CAD: CPT

## 2023-04-05 PROCEDURE — 77063 BREAST TOMOSYNTHESIS BI: CPT

## 2023-04-10 ENCOUNTER — NON-APPOINTMENT (OUTPATIENT)
Age: 73
End: 2023-04-10

## 2023-04-11 ENCOUNTER — NON-APPOINTMENT (OUTPATIENT)
Age: 73
End: 2023-04-11

## 2023-04-27 ENCOUNTER — RX RENEWAL (OUTPATIENT)
Age: 73
End: 2023-04-27

## 2023-05-08 ENCOUNTER — APPOINTMENT (OUTPATIENT)
Dept: CARDIOLOGY | Facility: CLINIC | Age: 73
End: 2023-05-08
Payer: MEDICARE

## 2023-05-08 ENCOUNTER — NON-APPOINTMENT (OUTPATIENT)
Age: 73
End: 2023-05-08

## 2023-05-08 VITALS
BODY MASS INDEX: 23.3 KG/M2 | HEART RATE: 55 BPM | DIASTOLIC BLOOD PRESSURE: 60 MMHG | OXYGEN SATURATION: 97 % | WEIGHT: 140 LBS | SYSTOLIC BLOOD PRESSURE: 120 MMHG

## 2023-05-08 PROCEDURE — 93000 ELECTROCARDIOGRAM COMPLETE: CPT

## 2023-05-08 PROCEDURE — 99214 OFFICE O/P EST MOD 30 MIN: CPT

## 2023-05-08 NOTE — HISTORY OF PRESENT ILLNESS
[FreeTextEntry1] : 73-year-old female with a history of nonobstructive CAD, paroxysmal atrial fibrillation, hypertension, hypercholesterolemia, diabetes.  She was last seen 6 months ago.  She has been generally well.  She "did not feel well" yesterday.  She took her blood pressure which was slightly elevated and the device indicated that she might of been in atrial fibrillation.  She did not have any palpitations and the episode stopped spontaneously.\par \par \par Her most recent blood work showed an LDL cholesterol of 66 and an A1c of 6.5.

## 2023-05-08 NOTE — DISCUSSION/SUMMARY
[FreeTextEntry1] : Ms Villavicencio may have been in atrial fibrillation yesterday, but has had very infrequent palpitations.  Her exam shows regular rhythm, normal blood pressure, clear lungs, and a normal cardiac exam.  Her EKG shows half millimeter ST segment depression and biphasic T's in V2 and V3 which have been noted in the past.  As noted above her blood work indicates good control of her diabetes and hypercholesterolemia.\par \par She is doing well.  She will continue on her current regimen of Jardiance, metformin, sotalol, rosuvastatin, Xarelto.  She will follow-up in 6 months. [EKG obtained to assist in diagnosis and management of assessed problem(s)] : EKG obtained to assist in diagnosis and management of assessed problem(s)

## 2023-05-22 ENCOUNTER — TRANSCRIPTION ENCOUNTER (OUTPATIENT)
Age: 73
End: 2023-05-22

## 2023-05-25 ENCOUNTER — NON-APPOINTMENT (OUTPATIENT)
Age: 73
End: 2023-05-25

## 2023-06-19 ENCOUNTER — RX RENEWAL (OUTPATIENT)
Age: 73
End: 2023-06-19

## 2023-06-19 ENCOUNTER — APPOINTMENT (OUTPATIENT)
Dept: ENDOCRINOLOGY | Facility: CLINIC | Age: 73
End: 2023-06-19
Payer: MEDICARE

## 2023-06-19 VITALS
OXYGEN SATURATION: 97 % | WEIGHT: 135 LBS | SYSTOLIC BLOOD PRESSURE: 112 MMHG | HEIGHT: 65 IN | BODY MASS INDEX: 22.49 KG/M2 | HEART RATE: 64 BPM | DIASTOLIC BLOOD PRESSURE: 62 MMHG

## 2023-06-19 DIAGNOSIS — E55.9 VITAMIN D DEFICIENCY, UNSPECIFIED: ICD-10-CM

## 2023-06-19 DIAGNOSIS — E53.8 DEFICIENCY OF OTHER SPECIFIED B GROUP VITAMINS: ICD-10-CM

## 2023-06-19 LAB
ALBUMIN SERPL ELPH-MCNC: 4.5 G/DL
ALP BLD-CCNC: 68 U/L
ALT SERPL-CCNC: 13 U/L
ANION GAP SERPL CALC-SCNC: 12 MMOL/L
AST SERPL-CCNC: 24 U/L
BILIRUB SERPL-MCNC: 0.5 MG/DL
BUN SERPL-MCNC: 14 MG/DL
CALCIUM SERPL-MCNC: 9.7 MG/DL
CHLORIDE SERPL-SCNC: 108 MMOL/L
CHOLEST SERPL-MCNC: 148 MG/DL
CO2 SERPL-SCNC: 24 MMOL/L
CREAT SERPL-MCNC: 0.85 MG/DL
EGFR: 71 ML/MIN/1.73M2
ESTIMATED AVERAGE GLUCOSE: 140 MG/DL
GLUCOSE SERPL-MCNC: 100 MG/DL
HBA1C MFR BLD HPLC: 6.5 %
HDLC SERPL-MCNC: 53 MG/DL
LDLC SERPL CALC-MCNC: 57 MG/DL
NONHDLC SERPL-MCNC: 95 MG/DL
POTASSIUM SERPL-SCNC: 4.4 MMOL/L
PROT SERPL-MCNC: 6.3 G/DL
SODIUM SERPL-SCNC: 144 MMOL/L
TRIGL SERPL-MCNC: 191 MG/DL

## 2023-06-19 PROCEDURE — 99214 OFFICE O/P EST MOD 30 MIN: CPT

## 2023-06-19 NOTE — ASSESSMENT
[Exercise/Effect on Glucose] : exercise/effect on glucose [Self Monitoring of Blood Glucose] : self monitoring of blood glucose [Retinopathy Screening] : Patient was referred to ophthalmology for retinopathy screening [FreeTextEntry1] : 70 yo woman with diagnoses of DM2, HTN, HLD and overweight and also hypoT and Afib. \par She has neuropathy. She states she has malabsorption.Memory impairment. \par She tried MF IR and severe diarrhea. \par \par DM2 : a1c 6.5. lost 5 lbs. \par cont MF  mg qd and  jardiance 10 mg qd  \par unhappy that she is loosing weight due to not eating due to food not tasting the same \par she can try stopping it for 2-3 weeks and see if any change and if no can restart \par evelyn/c ratio normal. \par diabetic  neuropathy: continue with B12 supplement \par encouraged more exercise walking 30 min 3 x week \par B12 defic: check levls \par \par HypoT / Hashimoto's \par cont Lt4 88 mcg qd and take 1/2 pill on Sundays \par \par HTN: bp at target on meds. Continue current management.\par \par HLD:  lipids very good. cont statin   \par \par OverweighT : discussed diet and exercise\par \par osteopenia with FRAX 14/ 3.4 % . She needs treatment and she will start next week after seeing rheumatology. \par cont vit d supplements\par start calcium 500 mg BID \par vitamin d defic; check level in 3 mos. \par \par

## 2023-06-20 LAB
CREAT SPEC-SCNC: 85 MG/DL
MICROALBUMIN 24H UR DL<=1MG/L-MCNC: 4 MG/DL
MICROALBUMIN/CREAT 24H UR-RTO: 47 MG/G

## 2023-07-16 LAB
ALBUMIN SERPL ELPH-MCNC: 4.8 G/DL
ALP BLD-CCNC: 79 U/L
ALT SERPL-CCNC: 17 U/L
ANION GAP SERPL CALC-SCNC: 14 MMOL/L
AST SERPL-CCNC: 28 U/L
BILIRUB SERPL-MCNC: 0.7 MG/DL
BUN SERPL-MCNC: 19 MG/DL
CALCIUM SERPL-MCNC: 10.3 MG/DL
CHLORIDE SERPL-SCNC: 106 MMOL/L
CO2 SERPL-SCNC: 24 MMOL/L
CREAT SERPL-MCNC: 0.88 MG/DL
CRP SERPL-MCNC: <3 MG/L
EGFR: 68 ML/MIN/1.73M2
ERYTHROCYTE [SEDIMENTATION RATE] IN BLOOD BY WESTERGREN METHOD: 24 MM/HR
GLUCOSE SERPL-MCNC: 119 MG/DL
POTASSIUM SERPL-SCNC: 4.6 MMOL/L
PROT SERPL-MCNC: 6.9 G/DL
SODIUM SERPL-SCNC: 144 MMOL/L

## 2023-07-27 ENCOUNTER — APPOINTMENT (OUTPATIENT)
Dept: RHEUMATOLOGY | Facility: CLINIC | Age: 73
End: 2023-07-27
Payer: MEDICARE

## 2023-07-27 VITALS
DIASTOLIC BLOOD PRESSURE: 68 MMHG | OXYGEN SATURATION: 98 % | WEIGHT: 135 LBS | HEIGHT: 65 IN | HEART RATE: 54 BPM | BODY MASS INDEX: 22.49 KG/M2 | SYSTOLIC BLOOD PRESSURE: 161 MMHG | TEMPERATURE: 97.9 F

## 2023-07-27 PROCEDURE — 99214 OFFICE O/P EST MOD 30 MIN: CPT

## 2023-07-27 NOTE — HISTORY OF PRESENT ILLNESS
[FreeTextEntry1] : HISTORY: \par 74 y/o female with T2DM, hypothyroidism, HTN, HLD, neuropathy, AFib on Xarelto presents as follow up for RA.  \par Pt was diagnosed with RA in 2014 with joint inflammation of hands, wrists, shoulders, neck. Pt was treated with steroids but developed diabetes. Pt has been on MTX for a long time with resolution of her RA flares. Pt only reports R 3rd Randy's nodes as a long term consequence of RA. \par Pt’s prior rheumatologist Dr. De La Fuente in [place] ([facility]) no longer takes her insurance. Last seen by Dr. De La Fuente on 9/2022. Pt is on MTX 7 tabs/week with daily folic acid. \par Pt has never had DXA scan done. Denies fractures. \par \par INTERVAL HISTORY: \par Pt has restarted MTX 7 tabs/week and folic acid without any changes in her pains.\par Pt's worst pain is in L hip at the end of the day. Denies any other significant pains.\par Pt underwent PT with improvement of her gait.\par \par WORKUP: \par DXA (4/2023): Osteopenia. Lowest T-score –1.6 femoral neck. FRAX major 14%, hip 3.4%\par

## 2023-07-27 NOTE — ASSESSMENT
[FreeTextEntry1] : 72 y/o female with T2DM, hypothyroidism, HTN, HLD, neuropathy, AFib on Xarelto presents as follow up for RA.  \par Pt was diagnosed with RA in 2014 with joint inflammation of hands, wrists, shoulders, neck. Pt was treated with steroids but developed diabetes. Pt has been on MTX for a long time with resolution of her RA flares. Pt only reports R 3rd Randy's nodes as a long term consequence of RA. \par Pt’s prior rheumatologist Dr. De La Fuente in [place] ([facility]) no longer takes her insurance. Last seen by Dr. De La Fuente on 9/2022. Pt is on MTX 7 tabs/week with daily folic acid. \par \par Pt has clear Hx of RA well controlled with MTX for almost 10 years. Pt without any major joint inflammation at this time. Decrease in MTX dosage can be considered.\par \par DXA by me showed osteopenia with high FRAX score of hip. Discussed with pt about treatment options.\par \par - Last medication safety labs 2 weeks ago. \par - Obtain XR L hip\par - Decrease MTX 7 -> 4 tabs/week. c/w daily folic acid. Side effects of MTX were discussed with the patient in detail including but not limited to oral ulcers, alopecia, elevated LFTs, abdominal discomfort, pneumonitis, and cytopenias. This is a high-risk therapy requiring intense monitoring for toxicity. Will evaluate with frequent monitoring of CBC and LFTs. Advised patient to take folic acid daily to prevent complications of MTX. \par Counseled to minimize alcohol intake (no more than 3 drinks/week). \par - Last Hep B/C, Quantiferon TB negative in 1/2023. \par - Rx Boniva 150mg QMonthly for osteopenia with high FRAX score. Side effects of bisphosphonates were discussed with the pt in detail including bone pain and flu-like illness x 2-3 days, ONJ, atypical femoral fractures.\par Advised to take on empty stomach in morning with full glass of water and to remain upright and NPO for 30-60 mins.\par Avoid if CrCl < 30, malabsorption, plan for invasive oral procedure, pregnancy, breastfeeding.\par - Last DXA 4/2023. Next DXA due 4/2025 \par - RTC 3 months for follow up \par

## 2023-08-01 ENCOUNTER — APPOINTMENT (OUTPATIENT)
Dept: RADIOLOGY | Facility: CLINIC | Age: 73
End: 2023-08-01
Payer: MEDICARE

## 2023-08-01 PROCEDURE — 73502 X-RAY EXAM HIP UNI 2-3 VIEWS: CPT | Mod: LT

## 2023-08-02 ENCOUNTER — NON-APPOINTMENT (OUTPATIENT)
Age: 73
End: 2023-08-02

## 2023-08-02 LAB
T4 FREE SERPL-MCNC: 1.8 NG/DL
TSH SERPL-ACNC: 0.03 UIU/ML

## 2023-08-03 ENCOUNTER — TRANSCRIPTION ENCOUNTER (OUTPATIENT)
Age: 73
End: 2023-08-03

## 2023-08-03 ENCOUNTER — NON-APPOINTMENT (OUTPATIENT)
Age: 73
End: 2023-08-03

## 2023-10-03 ENCOUNTER — NON-APPOINTMENT (OUTPATIENT)
Age: 73
End: 2023-10-03

## 2023-10-03 ENCOUNTER — APPOINTMENT (OUTPATIENT)
Dept: OPHTHALMOLOGY | Facility: CLINIC | Age: 73
End: 2023-10-03
Payer: MEDICARE

## 2023-10-03 PROCEDURE — 92004 COMPRE OPH EXAM NEW PT 1/>: CPT

## 2023-10-03 PROCEDURE — 92134 CPTRZ OPH DX IMG PST SGM RTA: CPT

## 2023-10-09 LAB
25(OH)D3 SERPL-MCNC: 34.9 NG/ML
ALBUMIN SERPL ELPH-MCNC: 4.6 G/DL
ALP BLD-CCNC: 71 U/L
ALT SERPL-CCNC: 10 U/L
ANION GAP SERPL CALC-SCNC: 13 MMOL/L
AST SERPL-CCNC: 21 U/L
BILIRUB SERPL-MCNC: 0.5 MG/DL
BUN SERPL-MCNC: 13 MG/DL
CALCIUM SERPL-MCNC: 9.5 MG/DL
CHLORIDE SERPL-SCNC: 105 MMOL/L
CHOLEST SERPL-MCNC: 163 MG/DL
CO2 SERPL-SCNC: 25 MMOL/L
CREAT SERPL-MCNC: 0.78 MG/DL
CREAT SPEC-SCNC: 119 MG/DL
EGFR: 79 ML/MIN/1.73M2
ESTIMATED AVERAGE GLUCOSE: 143 MG/DL
FOLATE SERPL-MCNC: >20 NG/ML
GLUCOSE SERPL-MCNC: 118 MG/DL
HBA1C MFR BLD HPLC: 6.6 %
HCT VFR BLD CALC: 46.1 %
HDLC SERPL-MCNC: 62 MG/DL
HGB BLD-MCNC: 14.4 G/DL
LDLC SERPL CALC-MCNC: 78 MG/DL
MCHC RBC-ENTMCNC: 29.8 PG
MCHC RBC-ENTMCNC: 31.2 GM/DL
MCV RBC AUTO: 95.4 FL
MICROALBUMIN 24H UR DL<=1MG/L-MCNC: 3.9 MG/DL
MICROALBUMIN/CREAT 24H UR-RTO: 33 MG/G
NONHDLC SERPL-MCNC: 101 MG/DL
PLATELET # BLD AUTO: 246 K/UL
POTASSIUM SERPL-SCNC: 4.7 MMOL/L
PROT SERPL-MCNC: 6.6 G/DL
RBC # BLD: 4.83 M/UL
RBC # FLD: 14.8 %
SODIUM SERPL-SCNC: 143 MMOL/L
T4 FREE SERPL-MCNC: 1 NG/DL
TRIGL SERPL-MCNC: 133 MG/DL
TSH SERPL-ACNC: 9.07 UIU/ML
VIT B12 SERPL-MCNC: 483 PG/ML
WBC # FLD AUTO: 8.32 K/UL

## 2023-10-10 ENCOUNTER — TRANSCRIPTION ENCOUNTER (OUTPATIENT)
Age: 73
End: 2023-10-10

## 2023-10-10 ENCOUNTER — RX RENEWAL (OUTPATIENT)
Age: 73
End: 2023-10-10

## 2023-10-11 ENCOUNTER — APPOINTMENT (OUTPATIENT)
Dept: ENDOCRINOLOGY | Facility: CLINIC | Age: 73
End: 2023-10-11
Payer: MEDICARE

## 2023-10-11 VITALS
BODY MASS INDEX: 21.66 KG/M2 | DIASTOLIC BLOOD PRESSURE: 62 MMHG | WEIGHT: 130 LBS | SYSTOLIC BLOOD PRESSURE: 102 MMHG | HEIGHT: 65 IN | OXYGEN SATURATION: 96 % | HEART RATE: 54 BPM

## 2023-10-11 PROCEDURE — 99214 OFFICE O/P EST MOD 30 MIN: CPT

## 2023-10-11 RX ORDER — IPRATROPIUM BROMIDE 42 UG/1
0.06 SPRAY NASAL 3 TIMES DAILY
Qty: 1 | Refills: 1 | Status: DISCONTINUED | COMMUNITY
Start: 2022-07-13 | End: 2023-10-11

## 2023-10-11 RX ORDER — METFORMIN ER 500 MG 500 MG/1
500 TABLET ORAL
Qty: 90 | Refills: 1 | Status: DISCONTINUED | COMMUNITY
Start: 2022-03-23 | End: 2023-10-11

## 2023-10-27 ENCOUNTER — APPOINTMENT (OUTPATIENT)
Dept: RHEUMATOLOGY | Facility: CLINIC | Age: 73
End: 2023-10-27
Payer: MEDICARE

## 2023-10-27 VITALS
DIASTOLIC BLOOD PRESSURE: 70 MMHG | SYSTOLIC BLOOD PRESSURE: 130 MMHG | HEART RATE: 45 BPM | BODY MASS INDEX: 21.49 KG/M2 | OXYGEN SATURATION: 99 % | TEMPERATURE: 96.8 F | WEIGHT: 129 LBS | HEIGHT: 65 IN

## 2023-10-27 DIAGNOSIS — Z79.631 LONG TERM (CURRENT) USE OF ANTIMETABOLITE AGENT: ICD-10-CM

## 2023-10-27 DIAGNOSIS — M85.80 OTHER SPECIFIED DISORDERS OF BONE DENSITY AND STRUCTURE, UNSPECIFIED SITE: ICD-10-CM

## 2023-10-27 PROCEDURE — 99214 OFFICE O/P EST MOD 30 MIN: CPT

## 2023-11-01 ENCOUNTER — NON-APPOINTMENT (OUTPATIENT)
Age: 73
End: 2023-11-01

## 2023-11-06 ENCOUNTER — APPOINTMENT (OUTPATIENT)
Dept: CARDIOLOGY | Facility: CLINIC | Age: 73
End: 2023-11-06
Payer: MEDICARE

## 2023-11-06 ENCOUNTER — NON-APPOINTMENT (OUTPATIENT)
Age: 73
End: 2023-11-06

## 2023-11-06 VITALS
DIASTOLIC BLOOD PRESSURE: 76 MMHG | HEIGHT: 65 IN | SYSTOLIC BLOOD PRESSURE: 130 MMHG | HEART RATE: 55 BPM | BODY MASS INDEX: 21.66 KG/M2 | OXYGEN SATURATION: 97 % | WEIGHT: 130 LBS

## 2023-11-06 PROCEDURE — 99214 OFFICE O/P EST MOD 30 MIN: CPT

## 2023-11-06 PROCEDURE — 93000 ELECTROCARDIOGRAM COMPLETE: CPT

## 2023-11-16 ENCOUNTER — RX RENEWAL (OUTPATIENT)
Age: 73
End: 2023-11-16

## 2023-11-22 LAB
T4 FREE SERPL-MCNC: 1.4 NG/DL
TSH SERPL-ACNC: 5.67 UIU/ML

## 2023-12-12 ENCOUNTER — NON-APPOINTMENT (OUTPATIENT)
Age: 73
End: 2023-12-12

## 2023-12-13 ENCOUNTER — APPOINTMENT (OUTPATIENT)
Dept: GERIATRICS | Facility: CLINIC | Age: 73
End: 2023-12-13

## 2023-12-13 ENCOUNTER — APPOINTMENT (OUTPATIENT)
Dept: GERIATRICS | Facility: CLINIC | Age: 73
End: 2023-12-13
Payer: MEDICARE

## 2023-12-13 VITALS
OXYGEN SATURATION: 98 % | HEART RATE: 49 BPM | HEIGHT: 65 IN | WEIGHT: 131 LBS | DIASTOLIC BLOOD PRESSURE: 56 MMHG | RESPIRATION RATE: 14 BRPM | BODY MASS INDEX: 21.83 KG/M2 | SYSTOLIC BLOOD PRESSURE: 146 MMHG | TEMPERATURE: 98 F

## 2023-12-13 DIAGNOSIS — Z12.2 ENCOUNTER FOR SCREENING FOR MALIGNANT NEOPLASM OF RESPIRATORY ORGANS: ICD-10-CM

## 2023-12-13 DIAGNOSIS — Z86.39 PERSONAL HISTORY OF OTHER ENDOCRINE, NUTRITIONAL AND METABOLIC DISEASE: ICD-10-CM

## 2023-12-13 DIAGNOSIS — Z86.79 PERSONAL HISTORY OF OTHER DISEASES OF THE CIRCULATORY SYSTEM: ICD-10-CM

## 2023-12-13 DIAGNOSIS — Z87.39 PERSONAL HISTORY OF OTHER DISEASES OF THE MUSCULOSKELETAL SYSTEM AND CONNECTIVE TISSUE: ICD-10-CM

## 2023-12-13 DIAGNOSIS — Z71.89 OTHER SPECIFIED COUNSELING: ICD-10-CM

## 2023-12-13 DIAGNOSIS — Z86.59 PERSONAL HISTORY OF OTHER MENTAL AND BEHAVIORAL DISORDERS: ICD-10-CM

## 2023-12-13 DIAGNOSIS — H91.93 UNSPECIFIED HEARING LOSS, BILATERAL: ICD-10-CM

## 2023-12-13 PROCEDURE — 90677 PCV20 VACCINE IM: CPT

## 2023-12-13 PROCEDURE — 99497 ADVNCD CARE PLAN 30 MIN: CPT

## 2023-12-13 PROCEDURE — G0009: CPT

## 2023-12-13 PROCEDURE — 99204 OFFICE O/P NEW MOD 45 MIN: CPT | Mod: 25

## 2023-12-14 PROBLEM — Z71.89 ADVANCE CARE PLANNING: Status: ACTIVE | Noted: 2023-12-14

## 2023-12-14 PROBLEM — Z86.59 HISTORY OF DEPRESSION: Status: RESOLVED | Noted: 2023-12-14 | Resolved: 2023-12-14

## 2023-12-14 PROBLEM — H91.93 HEARING LOSS, BILATERAL: Status: ACTIVE | Noted: 2023-12-13

## 2023-12-14 PROBLEM — Z87.39 HISTORY OF OSTEOPENIA: Status: RESOLVED | Noted: 2023-12-14 | Resolved: 2023-12-14

## 2023-12-14 PROBLEM — Z12.2 SCREENING FOR LUNG CANCER: Status: ACTIVE | Noted: 2023-12-13

## 2023-12-14 PROBLEM — Z86.79 HISTORY OF ATRIAL FIBRILLATION: Status: RESOLVED | Noted: 2023-12-14 | Resolved: 2023-12-14

## 2023-12-14 PROBLEM — Z86.79 HISTORY OF CORONARY ARTERY DISEASE: Status: RESOLVED | Noted: 2023-12-14 | Resolved: 2023-12-14

## 2023-12-14 PROBLEM — Z86.39 HISTORY OF HYPERLIPIDEMIA: Status: RESOLVED | Noted: 2023-12-14 | Resolved: 2023-12-14

## 2023-12-14 PROBLEM — Z86.39 HISTORY OF HYPOTHYROIDISM: Status: RESOLVED | Noted: 2023-12-14 | Resolved: 2023-12-14

## 2023-12-14 NOTE — HISTORY OF PRESENT ILLNESS
[Patient is independent with] : bathing [Independent] : managing finances [] : Assistance needed with traveling/transport [Cane] : cane [Smoke Detector] : smoke detector [Carbon Monoxide Detector] : carbon monoxide detector [Grab Bars] : grab bars [Night Light] : night light [Anti-Slip Measures] : anti-slip measures [Driving Concerns] : driving concerns noted [Wears Seat Belt] : wears seat belt [Wears Sunscreen] : wears sunscreen [Little interest or pleasure doing things] : 1) Little interest or pleasure doing things [0] : 1) Little interest or pleasure doing things: Not at all (0) [Feeling down, depressed, or hopeless] : 2) Feeling down, depressed, or hopeless [1] : 2) Feeling down, depressed, or hopeless for several days (1) [PHQ-2 Negative - No further assessment needed] : PHQ-2 Negative - No further assessment needed [Patient reported osteoporosis screening was normal] : Patient reported osteoporosis screening was normal [Patient reported hearing was normal] : Patient reported hearing was normal [Patient reported vision is abnormal] : Patient reported vision is abnormal [Patient reported Patient reported dental screening is normal] : Patient reported dental screening is normal [Patient reported colon/rectal/cancer screening was normal] : Patient reported colon/rectal cancer screening was normal [Patient reported skin cancer screening was normal] : Patient reported skin cancer screening was normal [Patient reported breast cancer screening was normal] : Patient reported breast cancer screening was normal [Patient reported cervical cancer screening was normal] : Patient reported cervical cancer screening was normal [No falls in past year] : Patient reported no falls in the past year [Completely Independent] : Completely independent. [With Patient/Caregiver] : , with patient/caregiver [Reviewed no changes] : Reviewed, no changes [Designated Healthcare Proxy] : Designated healthcare proxy [Relationship: ___] : Relationship: [unfilled] [Aggressive treatment] : aggressive treatment [I will adhere to the patient's wishes.] : I will adhere to the patient's wishes. [Time Spent: ___ minutes] : Time Spent: [unfilled] minutes [FreeTextEntry1] : 74 y/o F with PMHx of pAfib, CAD, DM, HLD, HTN, hypothyroidism, neuropathy, osteopenia, RA on MTX, cognitive decline, small berry aneurysm, vitamin D and B12 deficiency presenting to the practice to establish care.  Accompanied by daughter Ngoc Padilla, [phone]  PMD: Specialists: Cards: Dr. Dean Frederick Rheum: Dr. Reid Watkins Endo: Dr. Sofy Bartlett Eye Neuro: Dr. Suzy Judge, Gabbi Soto NP  Pharmacy: Meds: Folic Acid 1 mg daily Ibandronate 150 mg monthly Jardiance 10 mg daily Levothyroxine 50 mcg daily Methotrexate 2.5 mg 4 tabs weekly Rosuvastatin 20 mg daily Sotalol 80 mg Q12h Xarelto 20 mg daily Sertraline 25 mg daily Namenda 5 mg daily  Interim: Cards: An EKG shows a T wave inverted in V2 and some borderline ST segment depression which have been noted in the past. She is stable from a cardiac standpoint and doing well. She will continue on Radiance, rosuvastatin, sotalol, and Xarelto.  Rheum: Diagnosed with RA in 2014 with joint inflammation of hands, wrists, shoulders, neck. Pt was treated with steroids but developed diabetes. Pt has been on MTX for a long time with resolution of her RA flares. Pt only reports R 3rd Randy's nodes as a long term consequence of RA. Pt's prior rheumatologist Dr. De La Fuente in [place] ([facility]) no longer takes her insurance. Pt is on MTX 7 tabs/week with daily folic acid. Pt has clear Hx of RA well controlled with MTX for almost 10 years. Pt without any major joint inflammation at this time. Pt continues on MTX 4 tabs/week. Pt had one episode of RA flare but otherwise stable.  Osteopenia: Pt has been on Boniva since 7/2023 without side effects. XR L hip (8/2023): General osteopenia but otherwise normal, DXA (4/2023): Osteopenia. Lowest T-score -1.6 femoral neck. FRAX major 14%, hip 3.4%  Endo: Last A1c 6.6% in October 2023. On Jardiance but has diarrhea 2x per week. No microalbuminuria. On B12 supplement for diabetic neuropathy. Thyroid with Hashimotos now hypothyroid on levothyroxine. Last TSH 5.67 in November 2023.  Screening: Colon: reportedly normal from greater than 5 or 6 years ago, FOBT needed Skin: denies, aunt with skin cancer, no lupus Lung: smoking former for 3 decades, quit 2000's, CT-lung needed AAA: denies Cards: as noted CVA: denies Prostate/gyn: s/p b/l oophorpectomy 1984, s/p MICHAEL 1992, PCOS s/p wedge resection, last mammo approximately 2 years ago  Dental: denies Eye: cataracts Hearing: denies   Immunizations: COVID: fully vaccinated Flu: completed Shingles: completed PNA: wants today RSV: will send script   Mental: Depression: as noted Anxiety: as noted SI/SA: denies   Social: Smoking: former EtOH: denies Illicit drugs: marijuana Family: daughter Home: lives alone in senior community in [facility] but well connected with family Occupation: retired clerical worker   ^^^^^^^^^^^^^^^^^^^^^^^^^^^^^ Geriatric assessment:   4M's: Mobility: last time fall more than 1 year ago at [place] for [facility] fall, no assistive devices, would like PT Mentation: Namenda, alz, pet-CT pending, MRI unremarkable for vascular Medications: pill box, administers and sets up herself Matters: full code   Frail: -weight loss: gradual decline from 160 down to 130 over one year -decreased  strength, gait speed: has a cane, has slowed down, falls more than 1 year ago [TextBox_37] : Wears glasses [Guns at Home] : no guns at home [Stair Lift] : no stair lift used in home [Shower Chair] : no shower chair [de-identified] : has cleaning lady [[id number]] : 1 [AdvancecareDate] : 12/13/2023 [FreeTextEntry4] : HCP form and MOSLT introduced, family has some ACP forms done at home, wants to take home MOLST and review with

## 2023-12-14 NOTE — ASSESSMENT
[FreeTextEntry1] : Presenting for establish care visit  Diabetes on Jardiance  HTN and Afib on Sotalol, Xarelto, Crestor, following with cards  RA and osteopenia on MTX and Bonica, following with rheum  Alzheimer dementia c/b depression on sertraline and namenda, following neuro  ACP done  PNA shot, RSV shot given  Labs drawn  PT script  RTC in 1 month

## 2023-12-20 ENCOUNTER — LABORATORY RESULT (OUTPATIENT)
Age: 73
End: 2023-12-20

## 2023-12-28 ENCOUNTER — RX RENEWAL (OUTPATIENT)
Age: 73
End: 2023-12-28

## 2024-01-03 DIAGNOSIS — R63.4 ABNORMAL WEIGHT LOSS: ICD-10-CM

## 2024-01-10 ENCOUNTER — RX RENEWAL (OUTPATIENT)
Age: 74
End: 2024-01-10

## 2024-01-10 ENCOUNTER — APPOINTMENT (OUTPATIENT)
Dept: CT IMAGING | Facility: CLINIC | Age: 74
End: 2024-01-10
Payer: MEDICARE

## 2024-01-10 PROCEDURE — 71250 CT THORAX DX C-: CPT

## 2024-01-15 ENCOUNTER — INPATIENT (INPATIENT)
Facility: HOSPITAL | Age: 74
LOS: 1 days | Discharge: HOME HEALTH | DRG: 83 | End: 2024-01-17
Attending: SURGERY | Admitting: SURGERY
Payer: COMMERCIAL

## 2024-01-15 VITALS
DIASTOLIC BLOOD PRESSURE: 74 MMHG | TEMPERATURE: 98 F | HEART RATE: 89 BPM | SYSTOLIC BLOOD PRESSURE: 112 MMHG | OXYGEN SATURATION: 98 % | RESPIRATION RATE: 18 BRPM

## 2024-01-15 DIAGNOSIS — Z90.710 ACQUIRED ABSENCE OF BOTH CERVIX AND UTERUS: Chronic | ICD-10-CM

## 2024-01-15 DIAGNOSIS — S06.6XAA TRAUMATIC SUBARACHNOID HEMORRHAGE WITH LOSS OF CONSCIOUSNESS STATUS UNKNOWN, INITIAL ENCOUNTER: ICD-10-CM

## 2024-01-15 LAB
ANION GAP SERPL CALC-SCNC: 10 MMOL/L — SIGNIFICANT CHANGE UP (ref 5–17)
ANION GAP SERPL CALC-SCNC: 10 MMOL/L — SIGNIFICANT CHANGE UP (ref 5–17)
APTT BLD: 34.3 SEC — SIGNIFICANT CHANGE UP (ref 24.5–35.6)
APTT BLD: 34.3 SEC — SIGNIFICANT CHANGE UP (ref 24.5–35.6)
BASOPHILS # BLD AUTO: 0.07 K/UL — SIGNIFICANT CHANGE UP (ref 0–0.2)
BASOPHILS # BLD AUTO: 0.07 K/UL — SIGNIFICANT CHANGE UP (ref 0–0.2)
BASOPHILS NFR BLD AUTO: 1 % — SIGNIFICANT CHANGE UP (ref 0–2)
BASOPHILS NFR BLD AUTO: 1 % — SIGNIFICANT CHANGE UP (ref 0–2)
BUN SERPL-MCNC: 20.5 MG/DL — HIGH (ref 8–20)
BUN SERPL-MCNC: 20.5 MG/DL — HIGH (ref 8–20)
CALCIUM SERPL-MCNC: 9.1 MG/DL — SIGNIFICANT CHANGE UP (ref 8.4–10.5)
CALCIUM SERPL-MCNC: 9.1 MG/DL — SIGNIFICANT CHANGE UP (ref 8.4–10.5)
CHLORIDE SERPL-SCNC: 106 MMOL/L — SIGNIFICANT CHANGE UP (ref 96–108)
CHLORIDE SERPL-SCNC: 106 MMOL/L — SIGNIFICANT CHANGE UP (ref 96–108)
CO2 SERPL-SCNC: 26 MMOL/L — SIGNIFICANT CHANGE UP (ref 22–29)
CO2 SERPL-SCNC: 26 MMOL/L — SIGNIFICANT CHANGE UP (ref 22–29)
CREAT SERPL-MCNC: 0.7 MG/DL — SIGNIFICANT CHANGE UP (ref 0.5–1.3)
CREAT SERPL-MCNC: 0.7 MG/DL — SIGNIFICANT CHANGE UP (ref 0.5–1.3)
EGFR: 90 ML/MIN/1.73M2 — SIGNIFICANT CHANGE UP
EGFR: 90 ML/MIN/1.73M2 — SIGNIFICANT CHANGE UP
EOSINOPHIL # BLD AUTO: 0.32 K/UL — SIGNIFICANT CHANGE UP (ref 0–0.5)
EOSINOPHIL # BLD AUTO: 0.32 K/UL — SIGNIFICANT CHANGE UP (ref 0–0.5)
EOSINOPHIL NFR BLD AUTO: 4.5 % — SIGNIFICANT CHANGE UP (ref 0–6)
EOSINOPHIL NFR BLD AUTO: 4.5 % — SIGNIFICANT CHANGE UP (ref 0–6)
GLUCOSE BLDC GLUCOMTR-MCNC: 103 MG/DL — HIGH (ref 70–99)
GLUCOSE BLDC GLUCOMTR-MCNC: 178 MG/DL — HIGH (ref 70–99)
GLUCOSE BLDC GLUCOMTR-MCNC: 178 MG/DL — HIGH (ref 70–99)
GLUCOSE BLDC GLUCOMTR-MCNC: 83 MG/DL — SIGNIFICANT CHANGE UP (ref 70–99)
GLUCOSE BLDC GLUCOMTR-MCNC: 95 MG/DL — SIGNIFICANT CHANGE UP (ref 70–99)
GLUCOSE BLDC GLUCOMTR-MCNC: 95 MG/DL — SIGNIFICANT CHANGE UP (ref 70–99)
GLUCOSE SERPL-MCNC: 105 MG/DL — HIGH (ref 70–99)
GLUCOSE SERPL-MCNC: 105 MG/DL — HIGH (ref 70–99)
HCT VFR BLD CALC: 41.2 % — SIGNIFICANT CHANGE UP (ref 34.5–45)
HCT VFR BLD CALC: 41.2 % — SIGNIFICANT CHANGE UP (ref 34.5–45)
HGB BLD-MCNC: 13.6 G/DL — SIGNIFICANT CHANGE UP (ref 11.5–15.5)
HGB BLD-MCNC: 13.6 G/DL — SIGNIFICANT CHANGE UP (ref 11.5–15.5)
IMM GRANULOCYTES NFR BLD AUTO: 0.3 % — SIGNIFICANT CHANGE UP (ref 0–0.9)
IMM GRANULOCYTES NFR BLD AUTO: 0.3 % — SIGNIFICANT CHANGE UP (ref 0–0.9)
INR BLD: 1 RATIO — SIGNIFICANT CHANGE UP (ref 0.85–1.18)
INR BLD: 1 RATIO — SIGNIFICANT CHANGE UP (ref 0.85–1.18)
LYMPHOCYTES # BLD AUTO: 1.54 K/UL — SIGNIFICANT CHANGE UP (ref 1–3.3)
LYMPHOCYTES # BLD AUTO: 1.54 K/UL — SIGNIFICANT CHANGE UP (ref 1–3.3)
LYMPHOCYTES # BLD AUTO: 21.4 % — SIGNIFICANT CHANGE UP (ref 13–44)
LYMPHOCYTES # BLD AUTO: 21.4 % — SIGNIFICANT CHANGE UP (ref 13–44)
MAGNESIUM SERPL-MCNC: 2 MG/DL — SIGNIFICANT CHANGE UP (ref 1.6–2.6)
MAGNESIUM SERPL-MCNC: 2 MG/DL — SIGNIFICANT CHANGE UP (ref 1.6–2.6)
MCHC RBC-ENTMCNC: 30.6 PG — SIGNIFICANT CHANGE UP (ref 27–34)
MCHC RBC-ENTMCNC: 30.6 PG — SIGNIFICANT CHANGE UP (ref 27–34)
MCHC RBC-ENTMCNC: 33 GM/DL — SIGNIFICANT CHANGE UP (ref 32–36)
MCHC RBC-ENTMCNC: 33 GM/DL — SIGNIFICANT CHANGE UP (ref 32–36)
MCV RBC AUTO: 92.6 FL — SIGNIFICANT CHANGE UP (ref 80–100)
MCV RBC AUTO: 92.6 FL — SIGNIFICANT CHANGE UP (ref 80–100)
MONOCYTES # BLD AUTO: 0.51 K/UL — SIGNIFICANT CHANGE UP (ref 0–0.9)
MONOCYTES # BLD AUTO: 0.51 K/UL — SIGNIFICANT CHANGE UP (ref 0–0.9)
MONOCYTES NFR BLD AUTO: 7.1 % — SIGNIFICANT CHANGE UP (ref 2–14)
MONOCYTES NFR BLD AUTO: 7.1 % — SIGNIFICANT CHANGE UP (ref 2–14)
MRSA PCR RESULT.: SIGNIFICANT CHANGE UP
MRSA PCR RESULT.: SIGNIFICANT CHANGE UP
NEUTROPHILS # BLD AUTO: 4.73 K/UL — SIGNIFICANT CHANGE UP (ref 1.8–7.4)
NEUTROPHILS # BLD AUTO: 4.73 K/UL — SIGNIFICANT CHANGE UP (ref 1.8–7.4)
NEUTROPHILS NFR BLD AUTO: 65.7 % — SIGNIFICANT CHANGE UP (ref 43–77)
NEUTROPHILS NFR BLD AUTO: 65.7 % — SIGNIFICANT CHANGE UP (ref 43–77)
PHOSPHATE SERPL-MCNC: 3.8 MG/DL — SIGNIFICANT CHANGE UP (ref 2.4–4.7)
PHOSPHATE SERPL-MCNC: 3.8 MG/DL — SIGNIFICANT CHANGE UP (ref 2.4–4.7)
PLATELET # BLD AUTO: 154 K/UL — SIGNIFICANT CHANGE UP (ref 150–400)
PLATELET # BLD AUTO: 154 K/UL — SIGNIFICANT CHANGE UP (ref 150–400)
POTASSIUM SERPL-MCNC: 3.9 MMOL/L — SIGNIFICANT CHANGE UP (ref 3.5–5.3)
POTASSIUM SERPL-MCNC: 3.9 MMOL/L — SIGNIFICANT CHANGE UP (ref 3.5–5.3)
POTASSIUM SERPL-SCNC: 3.9 MMOL/L — SIGNIFICANT CHANGE UP (ref 3.5–5.3)
POTASSIUM SERPL-SCNC: 3.9 MMOL/L — SIGNIFICANT CHANGE UP (ref 3.5–5.3)
PROTHROM AB SERPL-ACNC: 11.1 SEC — SIGNIFICANT CHANGE UP (ref 9.5–13)
PROTHROM AB SERPL-ACNC: 11.1 SEC — SIGNIFICANT CHANGE UP (ref 9.5–13)
RBC # BLD: 4.45 M/UL — SIGNIFICANT CHANGE UP (ref 3.8–5.2)
RBC # BLD: 4.45 M/UL — SIGNIFICANT CHANGE UP (ref 3.8–5.2)
RBC # FLD: 14 % — SIGNIFICANT CHANGE UP (ref 10.3–14.5)
RBC # FLD: 14 % — SIGNIFICANT CHANGE UP (ref 10.3–14.5)
S AUREUS DNA NOSE QL NAA+PROBE: SIGNIFICANT CHANGE UP
S AUREUS DNA NOSE QL NAA+PROBE: SIGNIFICANT CHANGE UP
SODIUM SERPL-SCNC: 142 MMOL/L — SIGNIFICANT CHANGE UP (ref 135–145)
SODIUM SERPL-SCNC: 142 MMOL/L — SIGNIFICANT CHANGE UP (ref 135–145)
WBC # BLD: 7.19 K/UL — SIGNIFICANT CHANGE UP (ref 3.8–10.5)
WBC # BLD: 7.19 K/UL — SIGNIFICANT CHANGE UP (ref 3.8–10.5)
WBC # FLD AUTO: 7.19 K/UL — SIGNIFICANT CHANGE UP (ref 3.8–10.5)
WBC # FLD AUTO: 7.19 K/UL — SIGNIFICANT CHANGE UP (ref 3.8–10.5)

## 2024-01-15 PROCEDURE — 99284 EMERGENCY DEPT VISIT MOD MDM: CPT

## 2024-01-15 PROCEDURE — 99222 1ST HOSP IP/OBS MODERATE 55: CPT

## 2024-01-15 PROCEDURE — 71045 X-RAY EXAM CHEST 1 VIEW: CPT | Mod: 26

## 2024-01-15 PROCEDURE — 70450 CT HEAD/BRAIN W/O DYE: CPT | Mod: 26

## 2024-01-15 PROCEDURE — 93010 ELECTROCARDIOGRAM REPORT: CPT

## 2024-01-15 PROCEDURE — 99285 EMERGENCY DEPT VISIT HI MDM: CPT

## 2024-01-15 PROCEDURE — 72170 X-RAY EXAM OF PELVIS: CPT | Mod: 26

## 2024-01-15 PROCEDURE — 99283 EMERGENCY DEPT VISIT LOW MDM: CPT

## 2024-01-15 RX ORDER — LEVOTHYROXINE SODIUM 125 MCG
50 TABLET ORAL DAILY
Refills: 0 | Status: DISCONTINUED | OUTPATIENT
Start: 2024-01-15 | End: 2024-01-17

## 2024-01-15 RX ORDER — INSULIN LISPRO 100/ML
VIAL (ML) SUBCUTANEOUS
Refills: 0 | Status: DISCONTINUED | OUTPATIENT
Start: 2024-01-15 | End: 2024-01-17

## 2024-01-15 RX ORDER — SODIUM CHLORIDE 9 MG/ML
1000 INJECTION INTRAMUSCULAR; INTRAVENOUS; SUBCUTANEOUS
Refills: 0 | Status: DISCONTINUED | OUTPATIENT
Start: 2024-01-15 | End: 2024-01-15

## 2024-01-15 RX ORDER — DEXTROSE 50 % IN WATER 50 %
15 SYRINGE (ML) INTRAVENOUS ONCE
Refills: 0 | Status: DISCONTINUED | OUTPATIENT
Start: 2024-01-15 | End: 2024-01-17

## 2024-01-15 RX ORDER — ROSUVASTATIN CALCIUM 5 MG/1
1 TABLET ORAL
Refills: 0 | DISCHARGE

## 2024-01-15 RX ORDER — SODIUM CHLORIDE 9 MG/ML
1000 INJECTION, SOLUTION INTRAVENOUS
Refills: 0 | Status: DISCONTINUED | OUTPATIENT
Start: 2024-01-15 | End: 2024-01-17

## 2024-01-15 RX ORDER — CHLORHEXIDINE GLUCONATE 213 G/1000ML
1 SOLUTION TOPICAL DAILY
Refills: 0 | Status: DISCONTINUED | OUTPATIENT
Start: 2024-01-15 | End: 2024-01-15

## 2024-01-15 RX ORDER — DEXTROSE 50 % IN WATER 50 %
25 SYRINGE (ML) INTRAVENOUS ONCE
Refills: 0 | Status: DISCONTINUED | OUTPATIENT
Start: 2024-01-15 | End: 2024-01-17

## 2024-01-15 RX ORDER — MEMANTINE HYDROCHLORIDE 10 MG/1
1 TABLET ORAL
Refills: 0 | DISCHARGE

## 2024-01-15 RX ORDER — ATORVASTATIN CALCIUM 80 MG/1
80 TABLET, FILM COATED ORAL AT BEDTIME
Refills: 0 | Status: DISCONTINUED | OUTPATIENT
Start: 2024-01-15 | End: 2024-01-17

## 2024-01-15 RX ORDER — DEXTROSE 50 % IN WATER 50 %
12.5 SYRINGE (ML) INTRAVENOUS ONCE
Refills: 0 | Status: DISCONTINUED | OUTPATIENT
Start: 2024-01-15 | End: 2024-01-17

## 2024-01-15 RX ORDER — LEVOTHYROXINE SODIUM 125 MCG
1 TABLET ORAL
Refills: 0 | DISCHARGE

## 2024-01-15 RX ORDER — ACETAMINOPHEN 500 MG
1000 TABLET ORAL EVERY 6 HOURS
Refills: 0 | Status: DISCONTINUED | OUTPATIENT
Start: 2024-01-15 | End: 2024-01-16

## 2024-01-15 RX ORDER — SOTALOL HCL 120 MG
80 TABLET ORAL EVERY 12 HOURS
Refills: 0 | Status: DISCONTINUED | OUTPATIENT
Start: 2024-01-15 | End: 2024-01-15

## 2024-01-15 RX ORDER — INSULIN LISPRO 100/ML
VIAL (ML) SUBCUTANEOUS EVERY 6 HOURS
Refills: 0 | Status: DISCONTINUED | OUTPATIENT
Start: 2024-01-15 | End: 2024-01-15

## 2024-01-15 RX ORDER — METHOTREXATE 2.5 MG/1
0 TABLET ORAL
Refills: 0 | DISCHARGE

## 2024-01-15 RX ORDER — GLUCAGON INJECTION, SOLUTION 0.5 MG/.1ML
1 INJECTION, SOLUTION SUBCUTANEOUS ONCE
Refills: 0 | Status: DISCONTINUED | OUTPATIENT
Start: 2024-01-15 | End: 2024-01-17

## 2024-01-15 RX ORDER — METHOTREXATE 2.5 MG/1
7.5 TABLET ORAL
Refills: 0 | DISCHARGE

## 2024-01-15 RX ORDER — EMPAGLIFLOZIN 10 MG/1
1 TABLET, FILM COATED ORAL
Refills: 0 | DISCHARGE

## 2024-01-15 RX ORDER — SOTALOL HCL 120 MG
1 TABLET ORAL
Refills: 0 | DISCHARGE

## 2024-01-15 RX ORDER — MEMANTINE HYDROCHLORIDE 10 MG/1
5 TABLET ORAL DAILY
Refills: 0 | Status: DISCONTINUED | OUTPATIENT
Start: 2024-01-15 | End: 2024-01-17

## 2024-01-15 RX ADMIN — CHLORHEXIDINE GLUCONATE 1 APPLICATION(S): 213 SOLUTION TOPICAL at 11:24

## 2024-01-15 RX ADMIN — ATORVASTATIN CALCIUM 80 MILLIGRAM(S): 80 TABLET, FILM COATED ORAL at 21:56

## 2024-01-15 RX ADMIN — MEMANTINE HYDROCHLORIDE 5 MILLIGRAM(S): 10 TABLET ORAL at 11:25

## 2024-01-15 RX ADMIN — Medication 1000 MILLIGRAM(S): at 07:28

## 2024-01-15 RX ADMIN — Medication 1: at 12:05

## 2024-01-15 RX ADMIN — Medication 50 MICROGRAM(S): at 06:09

## 2024-01-15 RX ADMIN — Medication 400 MILLIGRAM(S): at 06:58

## 2024-01-15 RX ADMIN — SODIUM CHLORIDE 75 MILLILITER(S): 9 INJECTION INTRAMUSCULAR; INTRAVENOUS; SUBCUTANEOUS at 05:53

## 2024-01-15 NOTE — H&P ADULT - NSHPPHYSICALEXAM_GEN_ALL_CORE
GENERAL: NAD, lying in bed comfortably  HEAD:  Scattered abrasions and  blood to face, lips, nose  NECK: Supple, no JVD  HEART: RRR  LUNGS: Unlabored respirations. No conversational dyspnea.   ABDOMEN: Soft, nontender, nondistended  EXTREMITIES: No clubbing, cyanosis, or edema  NERVOUS SYSTEM:  A&Ox3, no focal deficits   SKIN: No rashes or lesions

## 2024-01-15 NOTE — ED ADULT TRIAGE NOTE - CHIEF COMPLAINT QUOTE
Patient BIBEMS from [facility] [place] for intraparenchymal hemorrhage + nasal bone structure s/p fall today. Patient BIBEMS from [facility] [place] for intraparenchymal hemorrhage + nasal bone structure s/p fall today. Patient BIBEMS from [facility] [place] for intraparenchymal hemorrhage + nasal bone structure s/p fall today.

## 2024-01-15 NOTE — CONSULT NOTE ADULT - ASSESSMENT
Imp Lt frontal tSAH s/p mechanical fall on xeralto    Plan no neurosurgical intervention, hold xeralto, trauma eval, rpt CTH 6h earlier if MS decline    d/w attg Imp Lt frontal tSAH s/p mechanical fall on xeralto    Plan no neurosurgical intervention, hold xeralto, trauma eval, rpt CTH 6h earlier if MS decline, hourly neuro cks until CTH & final recs    d/w attg

## 2024-01-15 NOTE — CHART NOTE - NSCHARTNOTEFT_GEN_A_CORE
SICU TRANSFER NOTE  -----------------------------  ICU Admission Date: 1/15  Transfer Date: 01-15-24 @ 16:06    Admission Diagnosis:     1. Traumatic SAH that has been stable.  2. Nasal bone frx      Consultants:  [x] Geriatric      Medications  acetaminophen   IVPB .. 1000 milliGRAM(s) IV Intermittent every 6 hours PRN  atorvastatin 80 milliGRAM(s) Oral at bedtime  dextrose 5%. 1000 milliLiter(s) IV Continuous <Continuous>  dextrose 5%. 1000 milliLiter(s) IV Continuous <Continuous>  dextrose 50% Injectable 25 Gram(s) IV Push once  dextrose 50% Injectable 12.5 Gram(s) IV Push once  dextrose 50% Injectable 25 Gram(s) IV Push once  dextrose Oral Gel 15 Gram(s) Oral once PRN  glucagon  Injectable 1 milliGRAM(s) IntraMuscular once  insulin lispro (ADMELOG) corrective regimen sliding scale   SubCutaneous Before meals and at bedtime  levothyroxine 50 MICROGram(s) Oral daily  memantine 5 milliGRAM(s) Oral daily      [x] I attest I have reviewed and reconciled all medications prior to transfer    IV Fluids  sodium chloride 0.9%.: Solution, 1000 milliLiter(s) infuse at 75 mL/Hr  Provider's Contact #: [phone]        I have discussed this case with trauma team upon transfer and all questions regarding ICU course were answered.  The following items are to be followed up:    1. Q 4 neuro checks  2. Geriatric consult follow up  3. ENT Consult needs to be called.   4. Hold xeralto until after being seen by a neurosurgeon out pt.   5. FU Dr Singh in ~ 2 weeks. SICU TRANSFER NOTE  -----------------------------  ICU Admission Date: 1/15  Transfer Date: 01-15-24 @ 16:06    Admission Diagnosis:     1. Traumatic SAH that has been stable.  2. Nasal bone frx      Consultants:  [x] Geriatric      Medications  acetaminophen   IVPB .. 1000 milliGRAM(s) IV Intermittent every 6 hours PRN  atorvastatin 80 milliGRAM(s) Oral at bedtime  dextrose 5%. 1000 milliLiter(s) IV Continuous <Continuous>  dextrose 5%. 1000 milliLiter(s) IV Continuous <Continuous>  dextrose 50% Injectable 25 Gram(s) IV Push once  dextrose 50% Injectable 12.5 Gram(s) IV Push once  dextrose 50% Injectable 25 Gram(s) IV Push once  dextrose Oral Gel 15 Gram(s) Oral once PRN  glucagon  Injectable 1 milliGRAM(s) IntraMuscular once  insulin lispro (ADMELOG) corrective regimen sliding scale   SubCutaneous Before meals and at bedtime  levothyroxine 50 MICROGram(s) Oral daily  memantine 5 milliGRAM(s) Oral daily      [x] I attest I have reviewed and reconciled all medications prior to transfer    IV Fluids  sodium chloride 0.9%.: Solution, 1000 milliLiter(s) infuse at 75 mL/Hr  Provider's Contact #: [phone]        I have discussed this case with trauma team upon transfer and all questions regarding ICU course were answered.  The following items are to be followed up:    1. Q 4 neuro checks  2. Geriatric consult follow up  3. ENT Consult needs to be called.   4. Hold xeralto until after being seen by a neurosurgeon out pt.   5. FU Dr Singh in ~ 2 weeks. SICU TRANSFER NOTE  -----------------------------  ICU Admission Date: 1/15  Transfer Date: 01-15-24 @ 16:06    Admission Diagnosis:     1. Traumatic SAH that has been stable.  2. Nasal bone frx      Consultants:  [x] Geriatric      Medications  acetaminophen   IVPB .. 1000 milliGRAM(s) IV Intermittent every 6 hours PRN  atorvastatin 80 milliGRAM(s) Oral at bedtime  dextrose 5%. 1000 milliLiter(s) IV Continuous <Continuous>  dextrose 5%. 1000 milliLiter(s) IV Continuous <Continuous>  dextrose 50% Injectable 25 Gram(s) IV Push once  dextrose 50% Injectable 12.5 Gram(s) IV Push once  dextrose 50% Injectable 25 Gram(s) IV Push once  dextrose Oral Gel 15 Gram(s) Oral once PRN  glucagon  Injectable 1 milliGRAM(s) IntraMuscular once  insulin lispro (ADMELOG) corrective regimen sliding scale   SubCutaneous Before meals and at bedtime  levothyroxine 50 MICROGram(s) Oral daily  memantine 5 milliGRAM(s) Oral daily      [x] I attest I have reviewed and reconciled all medications prior to transfer    IV Fluids  sodium chloride 0.9%.: Solution, 1000 milliLiter(s) infuse at 75 mL/Hr  Provider's Contact #: [phone]        I have discussed this case with trauma team upon transfer and all questions regarding ICU course were answered.  The following items are to be followed up:    1. Q 4 neuro checks  2. Geriatric consult follow up  3. ENT Consult needs to be called.   4. Hold xeralto until after being seen by a neurosurgeon out pt.   5. FU Dr Singh in ~ 2 weeks.

## 2024-01-15 NOTE — ED ADULT NURSE NOTE - NSFALLUNIVINTERV_ED_ALL_ED
Bed/Stretcher in lowest position, wheels locked, appropriate side rails in place/Call bell, personal items and telephone in reach/Instruct patient to call for assistance before getting out of bed/chair/stretcher/Non-slip footwear applied when patient is off stretcher/[place] to call system/Physically safe environment - no spills, clutter or unnecessary equipment/Purposeful proactive rounding/Room/bathroom lighting operational, light cord in reach Bed/Stretcher in lowest position, wheels locked, appropriate side rails in place/Call bell, personal items and telephone in reach/Instruct patient to call for assistance before getting out of bed/chair/stretcher/Non-slip footwear applied when patient is off stretcher/[place] to call system/Physically safe environment - no spills, clutter or unnecessary equipment/Purposeful proactive rounding/Room/bathroom lighting operational, light cord in reach Bed/Stretcher in lowest position, wheels locked, appropriate side rails in place/Call bell, personal items and telephone in reach/Instruct patient to call for assistance before getting out of bed/chair/stretcher/Non-slip footwear applied when patient is off stretcher/[place] to call system/Physically safe environment - no spills, clutter or unnecessary equipment/Purposeful proactive rounding/Room/bathroom lighting operational, light cord in reach

## 2024-01-15 NOTE — CHART NOTE - NSCHARTNOTEFT_GEN_A_CORE
I have seen and examined the patient during SICU multidisciplinary rounds from 0366-5948 hrs.   Events noted.    Neuro: Awake, GCS 15 PARTIDA  CV: HD normal  Pulm: SaO2 adequate  GI: Soft, non tender  : urine flow adequate, electrolytes ok  ID: no iisues  Heme: H/H stable  Endo: glycemia at target    Plan:  TBI stable exam and stable CT  Serial exams  3 survey   Swallow eval  PT  dispo planning I have seen and examined the patient during SICU multidisciplinary rounds from 8228-3192 hrs.   Events noted.    Neuro: Awake, GCS 15 PARTIDA  CV: HD normal  Pulm: SaO2 adequate  GI: Soft, non tender  : urine flow adequate, electrolytes ok  ID: no iisues  Heme: H/H stable  Endo: glycemia at target    Plan:  TBI stable exam and stable CT  Serial exams  3 survey   Swallow eval  PT  dispo planning I have seen and examined the patient during SICU multidisciplinary rounds from 1048-1697 hrs.   Events noted.    Neuro: Awake, GCS 15 PARTIDA  CV: HD normal  Pulm: SaO2 adequate  GI: Soft, non tender  : urine flow adequate, electrolytes ok  ID: no iisues  Heme: H/H stable  Endo: glycemia at target    Plan:  TBI stable exam and stable CT  Serial exams  3 survey   Swallow eval  PT  dispo planning

## 2024-01-15 NOTE — ED PROVIDER NOTE - PHYSICAL EXAMINATION
A&O x 3, GCS 15, NAD  +L frontal hematoma, + L periorbital ecchymosis, + nasal deformity  C-spine with no midline TTP  lungs CTAB with no chest wall trauma or TTP   heart with reg rhythm without murmur  abdomen soft NTND  extremities with no edema/deformities  skin with no rashes   neuro exam non focal with no motor or sensory deficits and patient is moving all extremities spontaneously.

## 2024-01-15 NOTE — SWALLOW BEDSIDE ASSESSMENT ADULT - SLP PERTINENT HISTORY OF CURRENT PROBLEM
As per MD note, "72yo F w/ hx of RA, DM, HL, alzheimers, hypothyroidism presenting after mechanical fall and found to have SAH. Pt was walking in her home and slipped on the dog bed falling on her face. Endorses headstrike but denies LOC. Her daughter helped her up and brought her to ED for eval. Pt hemodynamically stable and underwent CT head and C-spine demonstrating small SAH and acute nondisplaced nasal bone fracture. She was transferred to [facility] for further eval. Pt in no distress with no acute complaints." As per MD note, "72yo F w/ hx of RA, DM, HL, alzheimers, hypothyroidism presenting after mechanical fall and found to have SAH. Pt was walking in her home and slipped on the dog bed falling on her face. Endorses headstrike but denies LOC. Her daughter helped her up and brought her to ED for eval. Pt hemodynamically stable and underwent CT head and C-spine demonstrating small SAH and acute nondisplaced nasal bone fracture. She was transferred to [facility] for further eval. Pt in no distress with no acute complaints." As per MD note, "72yo F w/ hx of RA, DM, HL, alzheimers, hypothyroidism presenting after mechanical fall and found to have SAH. Pt was walking in her home and slipped on the dog bed falling on her face. Endorses headstrike but denies LOC. Her daughter helped her up and brought her to ED for eval. Pt hemodynamically stable and underwent CT head and C-spine demonstrating small SAH and acute nondisplaced nasal bone fracture. She was transferred to [facility] for further eval. Pt in no distress with no acute complaints."

## 2024-01-15 NOTE — ED ADULT NURSE NOTE - OBJECTIVE STATEMENT
Pt presents SP fall from [facility] [place] for head bleed. Pt states she tripped and fell over a dog and hit face on floor. (+)HS, (-)LOC, (+) blood thinners. Pt states she takes Xarelto. Last dose last night. Abrasions and ecchymosis and noted to the face. Denies additional CO pain or discomfort @ this time. No reversal medications given @ [facility] [place]. Pt presents SP fall from [facility] [place] for head bleed. Pt states she tripped and fell over a dog and hit face on floor. (+)HS, (-)LOC, (+) blood thinners. Pt states she takes Xarelto. Last dose last night. Abrasions and ecchymosis and noted to the face. Denies additional CO pain or discomfort @ this time. No reversal medications given @ [facility] [place]. Pt presents SP fall from [facility] [place] [place] for head bleed. Pt states she tripped and fell over a dog and hit face on floor. (+)HS, (-)LOC, (+) blood thinners. Pt states she takes Xarelto. Last dose last night. Abrasions and ecchymosis and noted to the face. Denies additional CO pain or discomfort @ this time. No reversal medications given @ [facility] [place] [place].

## 2024-01-15 NOTE — PATIENT PROFILE ADULT - FALL HARM RISK - HARM RISK INTERVENTIONS
Assistance with ambulation/Assistance OOB with selected safe patient handling equipment/Communicate Risk of Fall with Harm to all staff/Discuss with provider need for PT consult/Monitor gait and stability/Provide patient with walking aids - walker, cane, crutches/Reinforce activity limits and safety measures with patient and family/Tailored Fall Risk Interventions/Visual Cue: Yellow wristband and red socks/Bed in lowest position, wheels locked, appropriate side rails in place/Call bell, personal items and telephone in reach/Instruct patient to call for assistance before getting out of bed or chair/Non-slip footwear when patient is out of bed/[place] to call system/Physically safe environment - no spills, clutter or unnecessary equipment/Purposeful Proactive Rounding/Room/bathroom lighting operational, light cord in reach Assistance with ambulation/Assistance OOB with selected safe patient handling equipment/Communicate Risk of Fall with Harm to all staff/Discuss with provider need for PT consult/Monitor gait and stability/Provide patient with walking aids - walker, cane, crutches/Reinforce activity limits and safety measures with patient and family/Tailored Fall Risk Interventions/Visual Cue: Yellow wristband and red socks/Bed in lowest position, wheels locked, appropriate side rails in place/Call bell, personal items and telephone in reach/Instruct patient to call for assistance before getting out of bed or chair/Non-slip footwear when patient is out of bed/[place] to call system/Physically safe environment - no spills, clutter or unnecessary equipment/Purposeful Proactive Rounding/Room/bathroom lighting operational, light cord in reach Assistance with ambulation/Assistance OOB with selected safe patient handling equipment/Communicate Risk of Fall with Harm to all staff/Discuss with provider need for PT consult/Monitor gait and stability/Provide patient with walking aids - walker, cane, crutches/Reinforce activity limits and safety measures with patient and family/Tailored Fall Risk Interventions/Visual Cue: Yellow wristband and red socks/Bed in lowest position, wheels locked, appropriate side rails in place/Call bell, personal items and telephone in reach/Instruct patient to call for assistance before getting out of bed or chair/Non-slip footwear when patient is out of bed/[place] to call system/Physically safe environment - no spills, clutter or unnecessary equipment/Purposeful Proactive Rounding/Room/bathroom lighting operational, light cord in reach

## 2024-01-15 NOTE — ED ADULT TRIAGE NOTE - NS ED NURSE AMBULANCES
[facility]

## 2024-01-15 NOTE — ED ADULT NURSE NOTE - CHIEF COMPLAINT QUOTE
[place] [facility] [place] + nasal bone structure s/p fall today. Patient [place] [facility] [place]rap[place]rench[place] hemorrhage + nasal bone structure s/p fall today. [place] [facility] [place] + nasal bone structure s/p fall today.

## 2024-01-15 NOTE — CHART NOTE - NSCHARTNOTEFT_GEN_A_CORE
Floor Transfer Acceptance Note  -----------------------------  ICU Admission Date: 1/15/24  Transfer Date: 01-15-24 @ 15:47    Admission Diagnosis: Traumatic SAH    Active Problems/injuries: Traumatic SAH    Consultants:  [ ] Cardiology  [ ] Endocrine  [ ] Infectious Disease  [ ] Medicine  [ x]Neurosurgery (Dr. Singh- Signed off on 1/15/24)  [ ] Ortho       [ ] Weight Bearing Status:  [ ] Palliative       [ ] Advanced Directives:    [ ] Physical Medicine and Rehab       [ ] Disposition :   [ ] Plastics  [ ] Pulmonary    Medications  acetaminophen   IVPB .. 1000 milliGRAM(s) IV Intermittent every 6 hours PRN  atorvastatin 80 milliGRAM(s) Oral at bedtime  chlorhexidine 2% Cloths 1 Application(s) Topical daily  dextrose 5%. 1000 milliLiter(s) IV Continuous <Continuous>  dextrose 5%. 1000 milliLiter(s) IV Continuous <Continuous>  dextrose 50% Injectable 25 Gram(s) IV Push once  dextrose 50% Injectable 12.5 Gram(s) IV Push once  dextrose 50% Injectable 25 Gram(s) IV Push once  dextrose Oral Gel 15 Gram(s) Oral once PRN  glucagon  Injectable 1 milliGRAM(s) IntraMuscular once  insulin lispro (ADMELOG) corrective regimen sliding scale   SubCutaneous every 6 hours  levothyroxine 50 MICROGram(s) Oral daily  memantine 5 milliGRAM(s) Oral daily      [x ] I attest I have reviewed and reconciled all medications prior to transfer    IV Fluids  sodium chloride 0.9%.: Solution, 1000 milliLiter(s) infuse at 75 mL/Hr  Provider's Contact #: [phone]  None    Antibiotics:  None    I have discussed this case with JN Casanova upon transfer and all questions regarding ICU course were answered.  The following items are to be followed up:  - Hold Xarelto until cleared to resume by Neurosurgeon as outpatient  -f/u Dr. Francisco mcfadden 2 weeks  -PT/OT for disposition appropriateness  -diet as tolerated  -Fall risk protocol  -d/c planning Floor Transfer Acceptance Note  -----------------------------  ICU Admission Date: 1/15/24  Transfer Date: 01-15-24 @ 15:47    Admission Diagnosis: Traumatic SAH    Active Problems/injuries: Traumatic SAH    Consultants:  [ ] Cardiology  [ ] Endocrine  [ ] Infectious Disease  [ ] Medicine  [ x]Neurosurgery (Dr. Singh- Signed off on 1/15/24)  [ ] Ortho       [ ] Weight Bearing Status:  [ ] Palliative       [ ] Advanced Directives:    [ ] Physical Medicine and Rehab       [ ] Disposition :   [ ] Plastics  [ ] Pulmonary    Medications  acetaminophen   IVPB .. 1000 milliGRAM(s) IV Intermittent every 6 hours PRN  atorvastatin 80 milliGRAM(s) Oral at bedtime  chlorhexidine 2% Cloths 1 Application(s) Topical daily  dextrose 5%. 1000 milliLiter(s) IV Continuous <Continuous>  dextrose 5%. 1000 milliLiter(s) IV Continuous <Continuous>  dextrose 50% Injectable 25 Gram(s) IV Push once  dextrose 50% Injectable 12.5 Gram(s) IV Push once  dextrose 50% Injectable 25 Gram(s) IV Push once  dextrose Oral Gel 15 Gram(s) Oral once PRN  glucagon  Injectable 1 milliGRAM(s) IntraMuscular once  insulin lispro (ADMELOG) corrective regimen sliding scale   SubCutaneous every 6 hours  levothyroxine 50 MICROGram(s) Oral daily  memantine 5 milliGRAM(s) Oral daily      [x ] I attest I have reviewed and reconciled all medications prior to transfer    IV Fluids  sodium chloride 0.9%.: Solution, 1000 milliLiter(s) infuse at 75 mL/Hr  Provider's Contact #: [phone]  None    Antibiotics:  None    I have discussed this case with JN Casanova upon transfer and all questions regarding ICU course were answered.  The following items are to be followed up:  - Hold Xarelto until cleared to resume by Neurosurgeon as outpatient  -f/u Dr. Francisco mcfadden 2 weeks  -PT/OT for disposition appropriateness  -diet as tolerated  -Fall risk protocol  -d/c planning Floor Transfer Acceptance Note  -----------------------------  ICU Admission Date: 1/15/24  Transfer Date: 01-15-24 @ 15:47    Admission Diagnosis: Traumatic SAH    Active Problems/injuries: Traumatic SAH    Consultants:  [ ] Cardiology  [ ] Endocrine  [ ] Infectious Disease  [ ] Medicine  [ x]Neurosurgery (Dr. Singh- Signed off on 1/15/24)  [ ] Ortho       [ ] Weight Bearing Status:  [ ] Palliative       [ ] Advanced Directives:    [ ] Physical Medicine and Rehab       [ ] Disposition :   [ ] Plastics  [ ] Pulmonary    Medications  acetaminophen   IVPB .. 1000 milliGRAM(s) IV Intermittent every 6 hours PRN  atorvastatin 80 milliGRAM(s) Oral at bedtime  chlorhexidine 2% Cloths 1 Application(s) Topical daily  dextrose 5%. 1000 milliLiter(s) IV Continuous <Continuous>  dextrose 5%. 1000 milliLiter(s) IV Continuous <Continuous>  dextrose 50% Injectable 25 Gram(s) IV Push once  dextrose 50% Injectable 12.5 Gram(s) IV Push once  dextrose 50% Injectable 25 Gram(s) IV Push once  dextrose Oral Gel 15 Gram(s) Oral once PRN  glucagon  Injectable 1 milliGRAM(s) IntraMuscular once  insulin lispro (ADMELOG) corrective regimen sliding scale   SubCutaneous every 6 hours  levothyroxine 50 MICROGram(s) Oral daily  memantine 5 milliGRAM(s) Oral daily      [x ] I attest I have reviewed and reconciled all medications prior to transfer    IV Fluids  sodium chloride 0.9%.: Solution, 1000 milliLiter(s) infuse at 75 mL/Hr  Provider's Contact #: [phone]  None    Antibiotics:  None    I have discussed this case with JN Casanova upon transfer and all questions regarding ICU course were answered.  The following items are to be followed up:  - Hold Xarelto until cleared to resume by Neurosurgeon as outpatient  -f/u Dr. Francisco mcfadden 2 weeks  -PT/OT for disposition appropriateness  -diet as tolerated  -Fall risk protocol  -d/c planning Floor Transfer Acceptance Note  -----------------------------  ICU Admission Date: 1/15/24  Transfer Date: 01-15-24 @ 15:47    Admission Diagnosis: Traumatic SAH    Active Problems/injuries: Traumatic SAH    Consultants:  [ ] Cardiology  [ ] Endocrine  [ ] Infectious Disease  [ ] Medicine  [ x]Neurosurgery (Dr. Singh- Signed off on 1/15/24)  [ ] Ortho       [ ] Weight Bearing Status:  [ ] Palliative       [ ] Advanced Directives:    [ ] Physical Medicine and Rehab       [ ] Disposition :   [ ] Plastics  [ ] Pulmonary    Medications  acetaminophen   IVPB .. 1000 milliGRAM(s) IV Intermittent every 6 hours PRN  atorvastatin 80 milliGRAM(s) Oral at bedtime  chlorhexidine 2% Cloths 1 Application(s) Topical daily  dextrose 5%. 1000 milliLiter(s) IV Continuous <Continuous>  dextrose 5%. 1000 milliLiter(s) IV Continuous <Continuous>  dextrose 50% Injectable 25 Gram(s) IV Push once  dextrose 50% Injectable 12.5 Gram(s) IV Push once  dextrose 50% Injectable 25 Gram(s) IV Push once  dextrose Oral Gel 15 Gram(s) Oral once PRN  glucagon  Injectable 1 milliGRAM(s) IntraMuscular once  insulin lispro (ADMELOG) corrective regimen sliding scale   SubCutaneous every 6 hours  levothyroxine 50 MICROGram(s) Oral daily  memantine 5 milliGRAM(s) Oral daily      [x ] I attest I have reviewed and reconciled all medications prior to transfer    IV Fluids  sodium chloride 0.9%.: Solution, 1000 milliLiter(s) infuse at 75 mL/Hr  Provider's Contact #: [phone]  None    Antibiotics:  None    I have discussed this case with JN Casanova upon transfer and all questions regarding ICU course were answered.  The following items are to be followed up:  - Hold Xarelto until cleared to resume by Neurosurgeon as outpatient  -f/u Dr. Singh x 2 weeks  -PT/OT for disposition appropriateness  -diet as tolerated  -Fall risk protocol  -24 hour stability scan for 6am on 1/16/24  -DVT proph on 1/16 if patient is not discharged prior  -d/c planning Floor Transfer Acceptance Note  -----------------------------  ICU Admission Date: 1/15/24  Transfer Date: 01-15-24 @ 15:47    Admission Diagnosis: Traumatic SAH    Active Problems/injuries: Traumatic SAH    Consultants:  [ ] Cardiology  [ ] Endocrine  [ ] Infectious Disease  [ ] Medicine  [ x]Neurosurgery (Dr. Singh- Signed off on 1/15/24)  [ ] Ortho       [ ] Weight Bearing Status:  [ ] Palliative       [ ] Advanced Directives:    [ ] Physical Medicine and Rehab       [ ] Disposition :   [ ] Plastics  [ ] Pulmonary    Medications  acetaminophen   IVPB .. 1000 milliGRAM(s) IV Intermittent every 6 hours PRN  atorvastatin 80 milliGRAM(s) Oral at bedtime  chlorhexidine 2% Cloths 1 Application(s) Topical daily  dextrose 5%. 1000 milliLiter(s) IV Continuous <Continuous>  dextrose 5%. 1000 milliLiter(s) IV Continuous <Continuous>  dextrose 50% Injectable 25 Gram(s) IV Push once  dextrose 50% Injectable 12.5 Gram(s) IV Push once  dextrose 50% Injectable 25 Gram(s) IV Push once  dextrose Oral Gel 15 Gram(s) Oral once PRN  glucagon  Injectable 1 milliGRAM(s) IntraMuscular once  insulin lispro (ADMELOG) corrective regimen sliding scale   SubCutaneous every 6 hours  levothyroxine 50 MICROGram(s) Oral daily  memantine 5 milliGRAM(s) Oral daily      [x ] I attest I have reviewed and reconciled all medications prior to transfer    IV Fluids  sodium chloride 0.9%.: Solution, 1000 milliLiter(s) infuse at 75 mL/Hr  Provider's Contact #: [phone]  None    Antibiotics:  None    I have discussed this case with JN Casanova upon transfer and all questions regarding ICU course were answered.  The following items are to be followed up:  - Hold Xarelto until cleared to resume by Neurosurgeon as outpatient  -f/u Dr. Singh x 2 weeks  -PT/OT for disposition appropriateness  -diet as tolerated  -Fall risk protocol  -24 hour stability scan for 6am on 1/16/24  -DVT proph on 1/16 if patient is not discharged prior  -d/c planning Floor Transfer Acceptance Note  -----------------------------  ICU Admission Date: 1/15/24  Transfer Date: 01-15-24 @ 15:47    Admission Diagnosis: Traumatic SAH    Active Problems/injuries: Traumatic SAH    Consultants:  [ ] Cardiology  [ ] Endocrine  [ ] Infectious Disease  [ ] Medicine  [ x]Neurosurgery (Dr. Singh- Signed off on 1/15/24)  [ ] Ortho       [ ] Weight Bearing Status:  [ ] Palliative       [ ] Advanced Directives:    [ ] Physical Medicine and Rehab       [ ] Disposition :   [ ] Plastics  [ ] Pulmonary    Medications  acetaminophen   IVPB .. 1000 milliGRAM(s) IV Intermittent every 6 hours PRN  atorvastatin 80 milliGRAM(s) Oral at bedtime  chlorhexidine 2% Cloths 1 Application(s) Topical daily  dextrose 5%. 1000 milliLiter(s) IV Continuous <Continuous>  dextrose 5%. 1000 milliLiter(s) IV Continuous <Continuous>  dextrose 50% Injectable 25 Gram(s) IV Push once  dextrose 50% Injectable 12.5 Gram(s) IV Push once  dextrose 50% Injectable 25 Gram(s) IV Push once  dextrose Oral Gel 15 Gram(s) Oral once PRN  glucagon  Injectable 1 milliGRAM(s) IntraMuscular once  insulin lispro (ADMELOG) corrective regimen sliding scale   SubCutaneous every 6 hours  levothyroxine 50 MICROGram(s) Oral daily  memantine 5 milliGRAM(s) Oral daily      [x ] I attest I have reviewed and reconciled all medications prior to transfer    IV Fluids  sodium chloride 0.9%.: Solution, 1000 milliLiter(s) infuse at 75 mL/Hr  Provider's Contact #: [phone]  None    Antibiotics:  None    I have discussed this case with JN Casanova upon transfer and all questions regarding ICU course were answered.  The following items are to be followed up:  - Hold Xarelto until cleared to resume by Neurosurgeon as outpatient  -f/u Dr. Singh x 2 weeks  -PT/OT for disposition appropriateness  -diet as tolerated  -Fall risk protocol  -24 hour stability scan for 6am on 1/16/24  -DVT proph on 1/16 if patient is not discharged prior  -d/c planning

## 2024-01-15 NOTE — H&P ADULT - NSHPLABSRESULTS_GEN_ALL_CORE
EXAM: CT CERVICAL SPINE ORDERED BY: MARY KAY SCHWARTZ    ACC: [id number] EXAM: CT MAXILLOFACIAL ORDERED BY: MARY KAY SCHWARTZ    ACC: [id number] EXAM: CT BRAIN ORDERED BY: MARY KAY SCHWARTZ    PROCEDURE DATE: 01/14/2024        INTERPRETATION: HISTORY: Fall onto face.    TECHNIQUE: Noncontrast CT of the head, face and cervical spine with coronal and sagittal reformatted images. 3D reconstructed images of the maxillofacial structures.    COMPARISON: 6/1/2022 head CT, 5/31/2022 cervical spine CT.    FINDINGS:    HEAD:  Thousand and small amount of hyperattenuation along the left posterior frontal cortex (2:26-27) compatible with a small amount of subarachnoid or subpial hemorrhage. No mass effect. No additional acute intracranial hemorrhage. No evidence of large acute territorial infarction. No hydrocephalus. Mild prominence of the ventricles and cortical sulci, nonspecific, likely reflects parenchymal volume loss. Scattered foci of white matter hypoattenuation without associated mass effect, nonspecific, likely chronic microvascular disease. Skull base vascular calcifications. Calvarium intact. Left frontal scalp soft tissue swelling without associated fracture.    FACE:  Mild nasal soft tissue swelling and irregularity of the left nasal bone likely nondisplaced fracture. No additional acute fractures. No dislocation. Orbital walls and contents are intact. Paranasal sinuses and mastoid air cells are clear.    CERVICAL SPINE:  Diffuse osseous demineralization. No evidence of acute fracture or traumatic listhesis. No prevertebral edema.  Anterolisthesis C3 on C4, C4 on C5, C6 and C7, C7 on T1 likely degenerative. The vertebral body heights are maintained. Degenerative loss of intervertebral disc height most pronounced at C5-C6. Multilevel discogenic and facet degenerative change resulting in variable multilevel spinal canal neural foraminal stenosis most pronounced at C5-6. No focal aggressive osseous lesions. Final interlobular septal thickening at the lung apices. Vascular calcifications.    IMPRESSION:    Small volume left frontal subarachnoid or subpial hemorrhage. Left frontal scalp soft tissue swelling without associated fracture.    Nondisplaced left nasal bone fracture and mild nasal soft tissue swelling.    No evidence of acute cervical spine injury.    Findings were discussed with Dr. MARY KAY SCHWARTZ 1/14/2024 9:32 PM by Dr. Gage with read back confirmation. EXAM: CT CERVICAL SPINE ORDERED BY: MARY KAY SCHWARTZ    ACC: [id number] EXAM: CT MAXILLOFACIAL ORDERED BY: MARY KAY SCHWARTZ    ACC: [id number] EXAM: CT BRAIN ORDERED BY: MARY KAY SCHWARTZ    PROCEDURE DATE: 01/14/2024        INTERPRETATION: HISTORY: Fall onto face.    TECHNIQUE: Noncontrast CT of the head, face and cervical spine with coronal and sagittal reformatted images. 3D reconstructed images of the maxillofacial structures.    COMPARISON: 6/1/2022 head CT, 5/31/2022 cervical spine CT.    FINDINGS:    HEAD:  Thousand and small amount of hyperattenuation along the left posterior frontal cortex (2:26-27) compatible with a small amount of subarachnoid or subpial hemorrhage. No mass effect. No additional acute intracranial hemorrhage. No evidence of large acute territorial infarction. No hydrocephalus. Mild prominence of the ventricles and cortical sulci, nonspecific, likely reflects parenchymal volume loss. Scattered foci of white matter hypoattenuation without associated mass effect, nonspecific, likely chronic microvascular disease. Skull base vascular calcifications. Calvarium intact. Left frontal scalp soft tissue swelling without associated fracture.    FACE:  Mild nasal soft tissue swelling and irregularity of the left nasal bone likely nondisplaced fracture. No additional acute fractures. No dislocation. Orbital walls and contents are intact. Paranasal sinuses and mastoid air cells are clear.    CERVICAL SPINE:  Diffuse osseous demineralization. No evidence of acute fracture or traumatic listhesis. No prevertebral edema.  Anterolisthesis C3 on C4, C4 on C5, C6 and C7, C7 on T1 likely degenerative. The vertebral body heights are maintained. Degenerative loss of intervertebral disc height most pronounced at C5-C6. Multilevel discogenic and facet degenerative change resulting in variable multilevel spinal canal neural foraminal stenosis most pronounced at C5-6. No focal aggressive osseous lesions. Final interlobular septal thickening at the lung apices. Vascular calcifications.    IMPRESSION:    Small volume left frontal subarachnoid or subpial hemorrhage. Left frontal scalp soft tissue swelling without associated fracture.    Nondisplaced left nasal bone fracture and mild nasal soft tissue swelling.    No evidence of acute cervical spine injury.    Findings were discussed with Dr. MARY KAY SCHWARTZ 1/14/2024 9:32 PM by Dr. Gage with read back confirmation. EXAM: CT CERVICAL SPINE ORDERED BY: MARY KAY SCHWARTZ    ACC: [id number] EXAM: CT MAXILLOFACIAL ORDERED BY: MARY KAY SCHWARTZ    ACC: [id number] EXAM: CT BRAIN ORDERED BY: MARY KAY SCHWARTZ    PROCEDURE DATE: 01/14/2024        INTERPRETATION: HISTORY: Fall onto face.    TECHNIQUE: Noncontrast CT of the head, face and cervical spine with coronal and sagittal reformatted images. 3D reconstructed images of the maxillofacial structures.    COMPARISON: 6/1/2022 head CT, 5/31/2022 cervical spine CT.    FINDINGS:    HEAD:  Thousand and small amount of hyperattenuation along the left posterior frontal cortex (2:26-27) compatible with a small amount of subarachnoid or subpial hemorrhage. No mass effect. No additional acute intracranial hemorrhage. No evidence of large acute territorial infarction. No hydrocephalus. Mild prominence of the ventricles and cortical sulci, nonspecific, likely reflects parenchymal volume loss. Scattered foci of white matter hypoattenuation without associated mass effect, nonspecific, likely chronic microvascular disease. Skull base vascular calcifications. Calvarium intact. Left frontal scalp soft tissue swelling without associated fracture.    FACE:  Mild nasal soft tissue swelling and irregularity of the left nasal bone likely nondisplaced fracture. No additional acute fractures. No dislocation. Orbital walls and contents are intact. Paranasal sinuses and mastoid air cells are clear.    CERVICAL SPINE:  Diffuse osseous demineralization. No evidence of acute fracture or traumatic listhesis. No prevertebral edema.  Anterolisthesis C3 on C4, C4 on C5, C6 and C7, C7 on T1 likely degenerative. The vertebral body heights are maintained. Degenerative loss of intervertebral disc height most pronounced at C5-C6. Multilevel discogenic and facet degenerative change resulting in variable multilevel spinal canal neural foraminal stenosis most pronounced at C5-6. No focal aggressive osseous lesions. Final interlobular septal thickening at the lung apices. Vascular calcifications.    IMPRESSION:    Small volume left frontal subarachnoid or subpial hemorrhage. Left frontal scalp soft tissue swelling without associated fracture.    Nondisplaced left nasal bone fracture and mild nasal soft tissue swelling.    No evidence of acute cervical spine injury.    Findings were discussed with Dr. MARY KAY SCHWARTZ 1/14/2024 9:32 PM by Dr. Gage with read back confirmation.

## 2024-01-15 NOTE — CONSULT NOTE ADULT - SUBJECTIVE AND OBJECTIVE BOX
HPI: 73F transfer from [place] where she presented after mechanical fall no LOC on xeralto for Afib last dose yesterday gigi, CTH there showed left frontal traumatic SAH & nasal bone fx  Pt denies h/a vision changes photophobia, states she was visiting her daughter who has a dog and the she stepped on a dog bed which slipped and she fell striking her face      PAST MEDICAL & SURGICAL HISTORY:  Afib  TAHBSO      FAMILY HISTORY:      HOME MEDICATIONS:  Home Medications:  Xeralto        Allergies    sulfa drugs (Rash)  cymbalta  mold    Intolerances          SOCIAL HISTORY:  Tobacco Use:  EtOH use:   Substance:    LABS:    crit 42%  Plt 194  Na 143      CULTURES:      RADIOLOGY & ADDITIONAL STUDIES:  CTH Lt frontal tSAH       REVIEW OF SYSTEMS  RESPIRATORY: No cough, wheezing  CARDIOVASCULAR: No chest pain, palpitations  GASTROINTESTINAL: No abdominal or epigastric pain  GENITOURINARY: No dysuria, frequency, hematuria, or incontinence      Vital Signs Last 24 Hrs  T(C): 36.7 (15 Anthony 2024 01:01), Max: 36.7 (15 Anthony 2024 01:01)  T(F): 98.1 (15 Anthony 2024 01:01), Max: 98.1 (15 Anthony 2024 01:01)  HR: 89 (15 Anthony 2024 01:01) (89 - 89)  BP: 112/74 (15 Anthony 2024 01:01) (112/74 - 112/74)  BP(mean): --  RR: 18 (15 Anthony 2024 01:01) (18 - 18)  SpO2: 98% (15 Anthony 2024 01:01) (98% - 98%)    Parameters below as of 15 Anthony 2024 01:01  Patient On (Oxygen Delivery Method): room air          PHYSICAL EXAM:  GENERAL: NAD, well-groomed  HEAD:  abrasions and left eye echymosis  THAI COMA SCORE: E4- V5- M6- =15  MENTAL STATUS: AAO x3  CRANIAL NERVES: EOMI smile bilateral tongue midline SILT V123 b/l head turn shoulder shrug ok speech clear hearing ok M5/5 no drift   HPI: 73F transfer from [place] where she presented after mechanical fall no LOC on xeralto for Afib last dose yesterday gigi, CTH there showed left frontal traumatic SAH & nasal bone fx  Pt denies h/a vision changes photophobia, states she was visiting her daughter who has a dog and the she stepped on a dog bed which slipped and she fell striking her face      PAST MEDICAL & SURGICAL HISTORY:  Afib  TAHBSO      FAMILY HISTORY:      HOME MEDICATIONS:  Home Medications:  Xeralto        Allergies    sulfa drugs (Rash)  cymbalta  mold    Intolerances          SOCIAL HISTORY:  Tobacco Use:  EtOH use:   Substance:    LABS:    crit 42%  Plt 194  Na 143      CULTURES:      RADIOLOGY & ADDITIONAL STUDIES:  CTH Lt frontal tSAH       REVIEW OF SYSTEMS  RESPIRATORY: No cough, wheezing  CARDIOVASCULAR: No chest pain, palpitations  GASTROINTESTINAL: No abdominal or epigastric pain  GENITOURINARY: No dysuria, frequency, hematuria, or incontinence      Vital Signs Last 24 Hrs  T(C): 36.7 (15 Anthony 2024 01:01), Max: 36.7 (15 Anthony 2024 01:01)  T(F): 98.1 (15 Anthony 2024 01:01), Max: 98.1 (15 Anthony 2024 01:01)  HR: 89 (15 Anthony 2024 01:01) (89 - 89)  BP: 112/74 (15 Anthony 2024 01:01) (112/74 - 112/74)  BP(mean): --  RR: 18 (15 Anthony 2024 01:01) (18 - 18)  SpO2: 98% (15 Anthony 2024 01:01) (98% - 98%)    Parameters below as of 15 Anthony 2024 01:01  Patient On (Oxygen Delivery Method): room air          PHYSICAL EXAM:  GENERAL: NAD, well-groomed  HEAD:  abrasions and left eye echymosis  THAI COMA SCORE: E4- V5- M6- =15  MENTAL STATUS: AAO x3  CRANIAL NERVES: EOMI smile bilateral tongue midline SILT V123 b/l head turn shoulder shrug ok speech clear hearing ok M5/5 no drift   HPI: 73F transfer from [place] where she presented after mechanical fall no LOC on xeralto for Afib last dose yesterday gigi, CTH there showed left frontal traumatic SAH & nasal bone fx  Pt denies h/a vision changes photophobia, states she was visiting her daughter who has a dog and the she stepped on a dog bed which slipped and she fell striking her face      PAST MEDICAL & SURGICAL HISTORY:  Afib  TAHBSO      FAMILY HISTORY:      HOME MEDICATIONS:  Home Medications:  Xeralto        Allergies    sulfa drugs (Rash)  cymbalta  mold    Intolerances          SOCIAL HISTORY:  Tobacco Use:  EtOH use:   Substance:    LABS:    crit 42%  Plt 194  Na 143      CULTURES:      RADIOLOGY & ADDITIONAL STUDIES:  CTH Lt frontal tSAH       REVIEW OF SYSTEMS  RESPIRATORY: No cough, wheezing  CARDIOVASCULAR: No chest pain, palpitations  GASTROINTESTINAL: No abdominal or epigastric pain  GENITOURINARY: No dysuria, frequency, hematuria, or incontinence      Vital Signs Last 24 Hrs  T(C): 36.7 (15 Anthony 2024 01:01), Max: 36.7 (15 Anthony 2024 01:01)  T(F): 98.1 (15 Anthony 2024 01:01), Max: 98.1 (15 Anthony 2024 01:01)  HR: 89 (15 Anthony 2024 01:01) (89 - 89)  BP: 112/74 (15 Anthony 2024 01:01) (112/74 - 112/74)  BP(mean): --  RR: 18 (15 Anthony 2024 01:01) (18 - 18)  SpO2: 98% (15 Anthony 2024 01:01) (98% - 98%)    Parameters below as of 15 Anthony 2024 01:01  Patient On (Oxygen Delivery Method): room air          PHYSICAL EXAM:  GENERAL: NAD, well-groomed  HEAD:  abrasions and left eye echymosis  THAI COMA SCORE: E4- V5- M6- =15  MENTAL STATUS: AAO x3  CRANIAL NERVES: EOMI smile bilateral tongue midline SILT V123 b/l head turn shoulder shrug ok speech clear hearing ok M5/5 no drift   HPI: 73F transfer from [place] where she presented after mechanical fall no LOC on xeralto for Afib last dose yesterday gigi, CTH there showed left frontal traumatic SAH & nasal bone fx  Pt denies h/a vision changes photophobia, states she was visiting her daughter who has a dog and the she stepped on a dog bed which slipped and she fell striking her face      PAST MEDICAL & SURGICAL HISTORY:  Afib  TAHBSO      FAMILY HISTORY:      HOME MEDICATIONS:  Home Medications:  Xeralto        Allergies    sulfa drugs (Rash)  cymbalta  mold    Intolerances          SOCIAL HISTORY:  Tobacco Use:  EtOH use:   Substance:    LABS:    crit 42%  Plt 194  Na 143      CULTURES:      RADIOLOGY & ADDITIONAL STUDIES:  CTH Lt frontal tSAH       REVIEW OF SYSTEMS  RESPIRATORY: No cough, wheezing  CARDIOVASCULAR: No chest pain, palpitations  GASTROINTESTINAL: No abdominal or epigastric pain  GENITOURINARY: No dysuria, frequency, hematuria, or incontinence      Vital Signs Last 24 Hrs  T(C): 36.7 (15 Anthony 2024 01:01), Max: 36.7 (15 Anthony 2024 01:01)  T(F): 98.1 (15 Anthony 2024 01:01), Max: 98.1 (15 Anthony 2024 01:01)  HR: 89 (15 Anthony 2024 01:01) (89 - 89)  BP: 112/74 (15 Anthony 2024 01:01) (112/74 - 112/74)  BP(mean): --  RR: 18 (15 Anthony 2024 01:01) (18 - 18)  SpO2: 98% (15 Anthony 2024 01:01) (98% - 98%)    Parameters below as of 15 Anthony 2024 01:01  Patient On (Oxygen Delivery Method): room air          PHYSICAL EXAM:  GENERAL: NAD, well-groomed  HEAD:  abrasions and left eye echymosis  THAI COMA SCORE: E4- V5- M6- =15  MENTAL STATUS: AAO x3  CRANIAL NERVES: EOMI smile bilateral tongue midline SILT V123 b/l head turn shoulder shrug ok speech clear hearing ok M5/5 no drift

## 2024-01-15 NOTE — ED ADULT TRIAGE NOTE - PATIENT ON (OXYGEN DELIVERY METHOD)
Topical Sulfur Applications Counseling: Topical Sulfur Counseling: Patient counseled that this medication may cause skin irritation or allergic reactions.  In the event of skin irritation, the patient was advised to reduce the amount of the drug applied or use it less frequently.   The patient verbalized understanding of the proper use and possible adverse effects of topical sulfur application.  All of the patient's questions and concerns were addressed. room air

## 2024-01-15 NOTE — CHART NOTE - NSCHARTNOTEFT_GEN_A_CORE
Tertiary Trauma Survey (TTS)    Date of TTS: 01-15-24 @ 16:10                             Admit Date: 01-15-24 @ 02:08      Trauma Activation:    List Injuries Identified to Date:    1. Traumatic SAH  2. Nasal bone frx        List Operative and Interventional Radiological Procedures:       Physical Exam:    Neuro: A&O X 3, no focal def    HEENT:  Periorbital ecchymosis    Pulm/Chest: CTA    Cardiac:  Irregularly irregular    GI / Abdomen: ND NT    Musculoskeletal / Extremities: NT AT, FROM Throughout.      RADIOLOGICAL FINDINGS REVIEW:  C-Spine non-acute  Head CT Stable Traumatic SAH  Max face,: Nasal bone fracture    INCIDENTAL FINDINGS:    [x] No      [x] Tertiary exam complete, there are no new injuries identified    No new images ordered.

## 2024-01-15 NOTE — H&P ADULT - ASSESSMENT
ASSESSMENT: 74yo F presenting after mechanical fall and found to have SAH and nondisplaced nasal bone fx.     PLAN:  - admit to SICU, Dr. Wilkins  - appreciate NSGY input  - q1 neuro checks  - 6 hour interval head CT  - holding xarelto  - resume home meds  - CXR, pelvic XR  - care per SICU team    Pt seen and plan discussed with attending, Dr. Wilkins  ASSESSMENT: 72yo F presenting after mechanical fall and found to have SAH and nondisplaced nasal bone fx.     PLAN:  - admit to SICU, Dr. Wilkins  - appreciate NSGY input  - q1 neuro checks  - 6 hour interval head CT  - holding xarelto  - resume home meds  - CXR, pelvic XR  - care per SICU team    Pt seen and plan discussed with attending, Dr. Wilkins

## 2024-01-15 NOTE — ED PROVIDER NOTE - OBJECTIVE STATEMENT
73-year-old female patient with past medical history of A-fib on Xarelto transferred from [facility] for traumatic subarachnoid hemorrhage.  Patient slipped and fell over a dog bed, hit her forehead and face on the ground.  She is alert and oriented in the department, no complaints at this time. 73-year-old female patient with past medical history of A-fib on Xarelto transferred from [facility] for traumatic subarachnoid hemorrhage.  Patient slipped and fell over a dog bed, hit her forehead and face on the ground.  She is alert and oriented in the department, no complaints at this time. 73-year-old female patient with past medical history of A-fib on Xarelto transferred from [facility] for traumatic subarachnoid hemorrhage.  Patient slipped and fell over a dog bed, hit her forehead and face on the ground.  She is alert and oriented in the department, no complaints at this time.

## 2024-01-15 NOTE — ED ADULT TRIAGE NOTE - HOSPITALS/PSYCHIATRIC FACILITIES
[facility]

## 2024-01-15 NOTE — CHART NOTE - NSCHARTNOTESELECT_GEN_ALL_CORE
Event Note
Neurosurgery s/o/Off Service Note
TERITIARY/Event Note
DOWNGRADE/Transfer Note
Floor Acceptance Note

## 2024-01-15 NOTE — CONSULT NOTE ADULT - NS ATTEND AMEND GEN_ALL_CORE FT
NSGY Attg:    see above    patient seen and examined    agree with above    plan of cared determined for tSAH  imaging reviewed  no acute neurosurgical intervention  recall prn  f/u as outpatient in 2 weeks

## 2024-01-15 NOTE — ED PROVIDER NOTE - CLINICAL SUMMARY MEDICAL DECISION MAKING FREE TEXT BOX
Patient with past medical history of A-fib on Xarelto transferred from [facility] with traumatic subarachnoid hemorrhage.  GCS 15 on arrival.  No complaints at this time.  Trauma surgery and neurosurgery contacted.  Patient to be admitted for monitoring and further workup. Patient with past medical history of A-fib on Xarelto transferred from [facility] with traumatic subarachnoid hemorrhage.  GCS 15 on arrival.  No complaints at this time.  Trauma surgery and neurosurgery contacted.  Patient to be admitted for monitoring and further workup. Patient with past medical history of A-fib on Xarelto transferred from [facility] with traumatic subarachnoid hemorrhage.  GCS 15 on arrival.  No complaints at this time.  Trauma surgery and neurosurgery contacted.  Patient to be admitted for monitoring and further workup.

## 2024-01-15 NOTE — H&P ADULT - NSICDXPASTMEDICALHX_GEN_ALL_CORE_FT
PAST MEDICAL HISTORY:  Alzheimer disease     Diabetes     H/O rheumatoid arthritis     Hyperlipidemia     Hypothyroidism

## 2024-01-15 NOTE — SWALLOW BEDSIDE ASSESSMENT ADULT - SLP GENERAL OBSERVATIONS
Pt received sitting upright in bed, awake/alert, Ox4, pleasant mood, cooperative, tolerating RA, 0/10 pain pre & post

## 2024-01-15 NOTE — CHART NOTE - NSCHARTNOTEFT_GEN_A_CORE
Pt seen and re-examined this morning. Neurologic exam remains intact. Ongoing ecchymosis and edema of L periorbital region, ecchymosis and edema of upper lip.     Repeat CTH stable. Hold Xarelto until cleared by a neurosurgeon. F/u with Dr. Singh in 2 weeks. Okay to discharge from neurosurgical standpoint. Reconsult prn. Discussed case w/ Dr. Singh    CT Head No Cont (01.15.24 @ 03:26)     IMPRESSION:    Stable left frontal subarachnoid hemorrhage. No new intracranial   hemorrhage or mass effect.

## 2024-01-15 NOTE — H&P ADULT - HISTORY OF PRESENT ILLNESS
No 74yo F w/ hx of RA, DM, HL, alzheimers, hypothyroidism presenting after mechanical fall and found to have SAH. Pt was walking in her home and slipped on the dog bed falling on her face. Endorses headstrike but denies LOC. Her daughter helped her up and brought her to ED for eval. Pt hemodynamically stable and underwent CT head and C-spine demonstrating small SAH and acute nondisplaced nasal bone fracture. She was transferred to [facility] for further eval. Pt in no distress with no acute complaints.  74yo F w/ hx of RA, DM, HL, alzheimers, hypothyroidism presenting after mechanical fall and found to have SAH. Pt was walking in her home and slipped on the dog bed falling on her face. Endorses headstrike but denies LOC. Her daughter helped her up and brought her to ED for eval. Pt hemodynamically stable and underwent CT head and C-spine demonstrating small SAH and acute nondisplaced nasal bone fracture. She was transferred to [facility] for further eval. Pt in no distress with no acute complaints.  72yo F w/ hx of RA, DM, HL, alzheimers, hypothyroidism presenting after mechanical fall and found to have SAH. Pt was walking in her home and slipped on the dog bed falling on her face. Endorses headstrike but denies LOC. Her daughter helped her up and brought her to ED for eval. Pt hemodynamically stable and underwent CT head and C-spine demonstrating small SAH and acute nondisplaced nasal bone fracture. She was transferred to [facility] for further eval. Pt in no distress with no acute complaints.

## 2024-01-16 DIAGNOSIS — S02.2XXA FRACTURE OF NASAL BONES, INITIAL ENCOUNTER FOR CLOSED FRACTURE: ICD-10-CM

## 2024-01-16 LAB
A1C WITH ESTIMATED AVERAGE GLUCOSE RESULT: 6.1 % — HIGH (ref 4–5.6)
ANION GAP SERPL CALC-SCNC: 14 MMOL/L — SIGNIFICANT CHANGE UP (ref 5–17)
BASOPHILS # BLD AUTO: 0.05 K/UL — SIGNIFICANT CHANGE UP (ref 0–0.2)
BASOPHILS NFR BLD AUTO: 0.6 % — SIGNIFICANT CHANGE UP (ref 0–2)
BUN SERPL-MCNC: 22.1 MG/DL — HIGH (ref 8–20)
CALCIUM SERPL-MCNC: 9.2 MG/DL — SIGNIFICANT CHANGE UP (ref 8.4–10.5)
CHLORIDE SERPL-SCNC: 104 MMOL/L — SIGNIFICANT CHANGE UP (ref 96–108)
CO2 SERPL-SCNC: 24 MMOL/L — SIGNIFICANT CHANGE UP (ref 22–29)
CREAT SERPL-MCNC: 0.87 MG/DL — SIGNIFICANT CHANGE UP (ref 0.5–1.3)
EGFR: 69 ML/MIN/1.73M2 — SIGNIFICANT CHANGE UP
EOSINOPHIL # BLD AUTO: 0.26 K/UL — SIGNIFICANT CHANGE UP (ref 0–0.5)
EOSINOPHIL NFR BLD AUTO: 2.9 % — SIGNIFICANT CHANGE UP (ref 0–6)
ESTIMATED AVERAGE GLUCOSE: 128 MG/DL — HIGH (ref 68–114)
GLUCOSE BLDC GLUCOMTR-MCNC: 107 MG/DL — HIGH (ref 70–99)
GLUCOSE BLDC GLUCOMTR-MCNC: 116 MG/DL — HIGH (ref 70–99)
GLUCOSE BLDC GLUCOMTR-MCNC: 119 MG/DL — HIGH (ref 70–99)
GLUCOSE BLDC GLUCOMTR-MCNC: 136 MG/DL — HIGH (ref 70–99)
GLUCOSE SERPL-MCNC: 108 MG/DL — HIGH (ref 70–99)
HCT VFR BLD CALC: 44.8 % — SIGNIFICANT CHANGE UP (ref 34.5–45)
HCV AB S/CO SERPL IA: 0.09 S/CO — SIGNIFICANT CHANGE UP (ref 0–0.99)
HCV AB S/CO SERPL IA: 0.09 S/CO — SIGNIFICANT CHANGE UP (ref 0–0.99)
HCV AB SERPL-IMP: SIGNIFICANT CHANGE UP
HCV AB SERPL-IMP: SIGNIFICANT CHANGE UP
HGB BLD-MCNC: 14.6 G/DL — SIGNIFICANT CHANGE UP (ref 11.5–15.5)
IMM GRANULOCYTES NFR BLD AUTO: 0.4 % — SIGNIFICANT CHANGE UP (ref 0–0.9)
LYMPHOCYTES # BLD AUTO: 1.47 K/UL — SIGNIFICANT CHANGE UP (ref 1–3.3)
LYMPHOCYTES # BLD AUTO: 16.2 % — SIGNIFICANT CHANGE UP (ref 13–44)
MAGNESIUM SERPL-MCNC: 1.9 MG/DL — SIGNIFICANT CHANGE UP (ref 1.6–2.6)
MCHC RBC-ENTMCNC: 30.3 PG — SIGNIFICANT CHANGE UP (ref 27–34)
MCHC RBC-ENTMCNC: 32.6 GM/DL — SIGNIFICANT CHANGE UP (ref 32–36)
MCV RBC AUTO: 92.9 FL — SIGNIFICANT CHANGE UP (ref 80–100)
MONOCYTES # BLD AUTO: 0.6 K/UL — SIGNIFICANT CHANGE UP (ref 0–0.9)
MONOCYTES NFR BLD AUTO: 6.6 % — SIGNIFICANT CHANGE UP (ref 2–14)
NEUTROPHILS # BLD AUTO: 6.63 K/UL — SIGNIFICANT CHANGE UP (ref 1.8–7.4)
NEUTROPHILS NFR BLD AUTO: 73.3 % — SIGNIFICANT CHANGE UP (ref 43–77)
PHOSPHATE SERPL-MCNC: 3.1 MG/DL — SIGNIFICANT CHANGE UP (ref 2.4–4.7)
PLATELET # BLD AUTO: 171 K/UL — SIGNIFICANT CHANGE UP (ref 150–400)
POTASSIUM SERPL-MCNC: 4.7 MMOL/L — SIGNIFICANT CHANGE UP (ref 3.5–5.3)
POTASSIUM SERPL-SCNC: 4.7 MMOL/L — SIGNIFICANT CHANGE UP (ref 3.5–5.3)
RBC # BLD: 4.82 M/UL — SIGNIFICANT CHANGE UP (ref 3.8–5.2)
RBC # FLD: 14.2 % — SIGNIFICANT CHANGE UP (ref 10.3–14.5)
SODIUM SERPL-SCNC: 142 MMOL/L — SIGNIFICANT CHANGE UP (ref 135–145)
WBC # BLD: 9.05 K/UL — SIGNIFICANT CHANGE UP (ref 3.8–10.5)
WBC # FLD AUTO: 9.05 K/UL — SIGNIFICANT CHANGE UP (ref 3.8–10.5)

## 2024-01-16 PROCEDURE — 70450 CT HEAD/BRAIN W/O DYE: CPT | Mod: 26

## 2024-01-16 PROCEDURE — 99221 1ST HOSP IP/OBS SF/LOW 40: CPT

## 2024-01-16 PROCEDURE — 99231 SBSQ HOSP IP/OBS SF/LOW 25: CPT

## 2024-01-16 RX ORDER — MAGNESIUM SULFATE 500 MG/ML
1 VIAL (ML) INJECTION ONCE
Refills: 0 | Status: COMPLETED | OUTPATIENT
Start: 2024-01-16 | End: 2024-01-16

## 2024-01-16 RX ORDER — ACETAMINOPHEN 500 MG
975 TABLET ORAL EVERY 6 HOURS
Refills: 0 | Status: DISCONTINUED | OUTPATIENT
Start: 2024-01-16 | End: 2024-01-17

## 2024-01-16 RX ADMIN — Medication 100 GRAM(S): at 08:23

## 2024-01-16 RX ADMIN — ATORVASTATIN CALCIUM 80 MILLIGRAM(S): 80 TABLET, FILM COATED ORAL at 21:18

## 2024-01-16 RX ADMIN — MEMANTINE HYDROCHLORIDE 5 MILLIGRAM(S): 10 TABLET ORAL at 11:28

## 2024-01-16 RX ADMIN — Medication 1000 MILLIGRAM(S): at 07:14

## 2024-01-16 RX ADMIN — Medication 400 MILLIGRAM(S): at 06:14

## 2024-01-16 RX ADMIN — Medication 975 MILLIGRAM(S): at 22:18

## 2024-01-16 RX ADMIN — Medication 975 MILLIGRAM(S): at 21:18

## 2024-01-16 RX ADMIN — Medication 50 MICROGRAM(S): at 05:51

## 2024-01-16 NOTE — CONSULT NOTE ADULT - ASSESSMENT
72 yo F s/p fall on face w L non displaced nasal bone fx. Pt without cosmetic or functional deficits  74 yo F s/p fall on face w L non displaced nasal bone fx. Pt without cosmetic or functional deficits

## 2024-01-16 NOTE — CONSULT NOTE ADULT - PROBLEM SELECTOR RECOMMENDATION 9
-No surgical intervention required as no functional deficit or cosmetic deformity  -Pt agreeable  -Please provide followup information for Dr. Bell in [place] paperwork for pt to follow up on an as needed basis

## 2024-01-16 NOTE — CONSULT NOTE ADULT - SUBJECTIVE AND OBJECTIVE BOX
CC: nasal bone fracture    HPI: 74yo F w/ hx of RA, DM, HL, alzheimers, hypothyroidism presenting after mechanical fall and found to have SAH. Pt was walking in her home and slipped on the dog bed falling on her face. Endorses headstrike but denies LOC. Her daughter helped her up and brought her to ED for eval. Pt hemodynamically stable and underwent CT head and C-spine demonstrating small SAH and acute nondisplaced nasal bone fracture. She was transferred to [facility] for further eval. Pt in no distress with no acute complaints.   ENT consulted to evaluate pt for nasal bone fracture. Pt denies new difficulties breathing through her nose.     PAST MEDICAL & SURGICAL HISTORY:  Hypothyroidism      Diabetes      H/O rheumatoid arthritis      Alzheimer disease      Hyperlipidemia      S/P total abdominal hysterectomy and bilateral salpingo-oophorectomy        Allergies    sulfa drugs (Rash)    Intolerances      MEDICATIONS  (STANDING):  atorvastatin 80 milliGRAM(s) Oral at bedtime  dextrose 5%. 1000 milliLiter(s) (50 mL/Hr) IV Continuous <Continuous>  dextrose 5%. 1000 milliLiter(s) (100 mL/Hr) IV Continuous <Continuous>  dextrose 50% Injectable 25 Gram(s) IV Push once  dextrose 50% Injectable 12.5 Gram(s) IV Push once  dextrose 50% Injectable 25 Gram(s) IV Push once  glucagon  Injectable 1 milliGRAM(s) IntraMuscular once  insulin lispro (ADMELOG) corrective regimen sliding scale   SubCutaneous Before meals and at bedtime  levothyroxine 50 MICROGram(s) Oral daily  memantine 5 milliGRAM(s) Oral daily    MEDICATIONS  (PRN):  acetaminophen     Tablet .. 975 milliGRAM(s) Oral every 6 hours PRN Mild Pain (1 - 3)  dextrose Oral Gel 15 Gram(s) Oral once PRN Blood Glucose LESS THAN 70 milliGRAM(s)/deciliter      Social History: No reported toxic habits    Family history: No significant medical history in first degree relatives       ROS:   ENT: all negative except as noted in HPI   Skin: No itching, dryness, rash, changes to hair, or skin masses  CV: denies palpitations  Pulm: denies SOB, cough, hemoptysis  GI: denies change in appetite, indigestion, n/v  : denies pertinent urinary symptoms, urgency  Neuro: denies numbness/tingling, loss of sensation  Psych: denies anxiety  MS: denies muscle weakness, instability  Heme: denies easy bruising or bleeding  Endo: denies heat/cold intolerance, excessive sweating  Vascular: denies LE edema    Vital Signs Last 24 Hrs  T(C): 36.8 (16 Jan 2024 08:28), Max: 37 (16 Jan 2024 00:05)  T(F): 98.2 (16 Jan 2024 08:28), Max: 98.6 (16 Jan 2024 00:05)  HR: 82 (16 Jan 2024 08:28) (49 - 82)  BP: 138/87 (16 Jan 2024 08:28) (114/70 - 156/70)  BP(mean): 95 (15 Anthony 2024 21:00) (68 - 111)  RR: 18 (16 Jan 2024 08:28) (15 - 23)  SpO2: 98% (16 Jan 2024 08:28) (94% - 100%)    Parameters below as of 16 Jan 2024 08:28  Patient On (Oxygen Delivery Method): room air                              14.6   9.05  )-----------( 171      ( 16 Jan 2024 04:35 )             44.8    01-16    142  |  104  |  22.1<H>  ----------------------------<  108<H>  4.7   |  24.0  |  0.87    Ca    9.2      16 Jan 2024 04:35  Phos  3.1     01-16  Mg     1.9     01-16     PT/INR - ( 15 Anthony 2024 04:00 )   PT: 11.1 sec;   INR: 1.00 ratio         PTT - ( 15 Anthony 2024 04:00 )  PTT:34.3 sec    PHYSICAL EXAM:  Gen: NAD  Skin: No rashes, bruises, or lesions  Head: Normocephalic, Atraumatic  Face: diffuse left sided bruising  Eyes: L periorbital edema, EOMs intact  Nose: Mild edema of the nasal bridge, no deviation, no bleeding or septal hematoma  Mouth: No Stridor / Drooling / Trismus.  Mucosa moist, tongue/uvula midline, oropharynx clear  Neck: Flat, supple, no lymphadenopathy, trachea midline, no masses  Lymphatic: No lymphadenopathy  Resp: breathing easily, no stridor  CV: no peripheral edema/cyanosis  GI: nondistended   Peripheral vascular: no JVD or edema  Neuro: facial nerve intact, no facial droop        IMAGING/ADDITIONAL STUDIES:   ACC: [id number] EXAM: CT CERVICAL SPINE ORDERED BY: MARY KAY SCHWARTZ    ACC: [id number] EXAM: CT MAXILLOFACIAL ORDERED BY: MARY KAY SCHWARTZ    ACC: [id number] EXAM: CT BRAIN ORDERED BY: MARY KAY SCHWARTZ    PROCEDURE DATE: 01/14/2024        INTERPRETATION: HISTORY: Fall onto face.    TECHNIQUE: Noncontrast CT of the head, face and cervical spine with coronal and sagittal reformatted images. 3D reconstructed images of the maxillofacial structures.    COMPARISON: 6/1/2022 head CT, 5/31/2022 cervical spine CT.    FINDINGS:    HEAD:  Thousand and small amount of hyperattenuation along the left posterior frontal cortex (2:26-27) compatible with a small amount of subarachnoid or subpial hemorrhage. No mass effect. No additional acute intracranial hemorrhage. No evidence of large acute territorial infarction. No hydrocephalus. Mild prominence of the ventricles and cortical sulci, nonspecific, likely reflects parenchymal volume loss. Scattered foci of white matter hypoattenuation without associated mass effect, nonspecific, likely chronic microvascular disease. Skull base vascular calcifications. Calvarium intact. Left frontal scalp soft tissue swelling without associated fracture.    FACE:  Mild nasal soft tissue swelling and irregularity of the left nasal bone likely nondisplaced fracture. No additional acute fractures. No dislocation. Orbital walls and contents are intact. Paranasal sinuses and mastoid air cells are clear.    CERVICAL SPINE:  Diffuse osseous demineralization. No evidence of acute fracture or traumatic listhesis. No prevertebral edema.  Anterolisthesis C3 on C4, C4 on C5, C6 and C7, C7 on T1 likely degenerative. The vertebral body heights are maintained. Degenerative loss of intervertebral disc height most pronounced at C5-C6. Multilevel discogenic and facet degenerative change resulting in variable multilevel spinal canal neural foraminal stenosis most pronounced at C5-6. No focal aggressive osseous lesions. Final interlobular septal thickening at the lung apices. Vascular calcifications.    IMPRESSION:    Small volume left frontal subarachnoid or subpial hemorrhage. Left frontal scalp soft tissue swelling without associated fracture.    Nondisplaced left nasal bone fracture and mild nasal soft tissue swelling.    No evidence of acute cervical spine injury.    Findings were discussed with Dr. MARY KAY SCHWARTZ 1/14/2024 9:32 PM by Dr. Gage with read back confirmation.   CC: nasal bone fracture    HPI: 74yo F w/ hx of RA, DM, HL, alzheimers, hypothyroidism presenting after mechanical fall and found to have SAH. Pt was walking in her home and slipped on the dog bed falling on her face. Endorses headstrike but denies LOC. Her daughter helped her up and brought her to ED for eval. Pt hemodynamically stable and underwent CT head and C-spine demonstrating small SAH and acute nondisplaced nasal bone fracture. She was transferred to [facility] for further eval. Pt in no distress with no acute complaints.   ENT consulted to evaluate pt for nasal bone fracture. Pt denies new difficulties breathing through her nose.     PAST MEDICAL & SURGICAL HISTORY:  Hypothyroidism      Diabetes      H/O rheumatoid arthritis      Alzheimer disease      Hyperlipidemia      S/P total abdominal hysterectomy and bilateral salpingo-oophorectomy        Allergies    sulfa drugs (Rash)    Intolerances      MEDICATIONS  (STANDING):  atorvastatin 80 milliGRAM(s) Oral at bedtime  dextrose 5%. 1000 milliLiter(s) (50 mL/Hr) IV Continuous <Continuous>  dextrose 5%. 1000 milliLiter(s) (100 mL/Hr) IV Continuous <Continuous>  dextrose 50% Injectable 25 Gram(s) IV Push once  dextrose 50% Injectable 12.5 Gram(s) IV Push once  dextrose 50% Injectable 25 Gram(s) IV Push once  glucagon  Injectable 1 milliGRAM(s) IntraMuscular once  insulin lispro (ADMELOG) corrective regimen sliding scale   SubCutaneous Before meals and at bedtime  levothyroxine 50 MICROGram(s) Oral daily  memantine 5 milliGRAM(s) Oral daily    MEDICATIONS  (PRN):  acetaminophen     Tablet .. 975 milliGRAM(s) Oral every 6 hours PRN Mild Pain (1 - 3)  dextrose Oral Gel 15 Gram(s) Oral once PRN Blood Glucose LESS THAN 70 milliGRAM(s)/deciliter      Social History: No reported toxic habits    Family history: No significant medical history in first degree relatives       ROS:   ENT: all negative except as noted in HPI   Skin: No itching, dryness, rash, changes to hair, or skin masses  CV: denies palpitations  Pulm: denies SOB, cough, hemoptysis  GI: denies change in appetite, indigestion, n/v  : denies pertinent urinary symptoms, urgency  Neuro: denies numbness/tingling, loss of sensation  Psych: denies anxiety  MS: denies muscle weakness, instability  Heme: denies easy bruising or bleeding  Endo: denies heat/cold intolerance, excessive sweating  Vascular: denies LE edema    Vital Signs Last 24 Hrs  T(C): 36.8 (16 Jan 2024 08:28), Max: 37 (16 Jan 2024 00:05)  T(F): 98.2 (16 Jan 2024 08:28), Max: 98.6 (16 Jan 2024 00:05)  HR: 82 (16 Jan 2024 08:28) (49 - 82)  BP: 138/87 (16 Jan 2024 08:28) (114/70 - 156/70)  BP(mean): 95 (15 Anthony 2024 21:00) (68 - 111)  RR: 18 (16 Jan 2024 08:28) (15 - 23)  SpO2: 98% (16 Jan 2024 08:28) (94% - 100%)    Parameters below as of 16 Jan 2024 08:28  Patient On (Oxygen Delivery Method): room air                              14.6   9.05  )-----------( 171      ( 16 Jan 2024 04:35 )             44.8    01-16    142  |  104  |  22.1<H>  ----------------------------<  108<H>  4.7   |  24.0  |  0.87    Ca    9.2      16 Jan 2024 04:35  Phos  3.1     01-16  Mg     1.9     01-16     PT/INR - ( 15 Anthony 2024 04:00 )   PT: 11.1 sec;   INR: 1.00 ratio         PTT - ( 15 Anthony 2024 04:00 )  PTT:34.3 sec    PHYSICAL EXAM:  Gen: NAD  Skin: No rashes, bruises, or lesions  Head: Normocephalic, Atraumatic  Face: diffuse left sided bruising  Eyes: L periorbital edema, EOMs intact  Nose: Mild edema of the nasal bridge, no deviation, no bleeding or septal hematoma  Mouth: No Stridor / Drooling / Trismus.  Mucosa moist, tongue/uvula midline, oropharynx clear  Neck: Flat, supple, no lymphadenopathy, trachea midline, no masses  Lymphatic: No lymphadenopathy  Resp: breathing easily, no stridor  CV: no peripheral edema/cyanosis  GI: nondistended   Peripheral vascular: no JVD or edema  Neuro: facial nerve intact, no facial droop        IMAGING/ADDITIONAL STUDIES:   ACC: [id number] EXAM: CT CERVICAL SPINE ORDERED BY: MARY KAY SCHWARTZ    ACC: [id number] EXAM: CT MAXILLOFACIAL ORDERED BY: MARY KAY SCHWARTZ    ACC: [id number] EXAM: CT BRAIN ORDERED BY: MARY KAY SCHWARTZ    PROCEDURE DATE: 01/14/2024        INTERPRETATION: HISTORY: Fall onto face.    TECHNIQUE: Noncontrast CT of the head, face and cervical spine with coronal and sagittal reformatted images. 3D reconstructed images of the maxillofacial structures.    COMPARISON: 6/1/2022 head CT, 5/31/2022 cervical spine CT.    FINDINGS:    HEAD:  Thousand and small amount of hyperattenuation along the left posterior frontal cortex (2:26-27) compatible with a small amount of subarachnoid or subpial hemorrhage. No mass effect. No additional acute intracranial hemorrhage. No evidence of large acute territorial infarction. No hydrocephalus. Mild prominence of the ventricles and cortical sulci, nonspecific, likely reflects parenchymal volume loss. Scattered foci of white matter hypoattenuation without associated mass effect, nonspecific, likely chronic microvascular disease. Skull base vascular calcifications. Calvarium intact. Left frontal scalp soft tissue swelling without associated fracture.    FACE:  Mild nasal soft tissue swelling and irregularity of the left nasal bone likely nondisplaced fracture. No additional acute fractures. No dislocation. Orbital walls and contents are intact. Paranasal sinuses and mastoid air cells are clear.    CERVICAL SPINE:  Diffuse osseous demineralization. No evidence of acute fracture or traumatic listhesis. No prevertebral edema.  Anterolisthesis C3 on C4, C4 on C5, C6 and C7, C7 on T1 likely degenerative. The vertebral body heights are maintained. Degenerative loss of intervertebral disc height most pronounced at C5-C6. Multilevel discogenic and facet degenerative change resulting in variable multilevel spinal canal neural foraminal stenosis most pronounced at C5-6. No focal aggressive osseous lesions. Final interlobular septal thickening at the lung apices. Vascular calcifications.    IMPRESSION:    Small volume left frontal subarachnoid or subpial hemorrhage. Left frontal scalp soft tissue swelling without associated fracture.    Nondisplaced left nasal bone fracture and mild nasal soft tissue swelling.    No evidence of acute cervical spine injury.    Findings were discussed with Dr. MARY KAY SCHWARTZ 1/14/2024 9:32 PM by Dr. Gage with read back confirmation.

## 2024-01-16 NOTE — PHYSICAL THERAPY INITIAL EVALUATION ADULT - ADDITIONAL COMMENTS
Pt lives in a apt with 10 steps to enter with  rails and  0 stairs inside.  Pt owns medical equipment: [employer]  Pt lives with: Alone

## 2024-01-17 ENCOUNTER — TRANSCRIPTION ENCOUNTER (OUTPATIENT)
Age: 74
End: 2024-01-17

## 2024-01-17 VITALS
SYSTOLIC BLOOD PRESSURE: 115 MMHG | DIASTOLIC BLOOD PRESSURE: 84 MMHG | HEART RATE: 92 BPM | TEMPERATURE: 98 F | RESPIRATION RATE: 18 BRPM | OXYGEN SATURATION: 98 %

## 2024-01-17 LAB
GLUCOSE BLDC GLUCOMTR-MCNC: 104 MG/DL — HIGH (ref 70–99)
GLUCOSE BLDC GLUCOMTR-MCNC: 164 MG/DL — HIGH (ref 70–99)

## 2024-01-17 PROCEDURE — 83036 HEMOGLOBIN GLYCOSYLATED A1C: CPT

## 2024-01-17 PROCEDURE — 99285 EMERGENCY DEPT VISIT HI MDM: CPT

## 2024-01-17 PROCEDURE — 36415 COLL VENOUS BLD VENIPUNCTURE: CPT

## 2024-01-17 PROCEDURE — 86803 HEPATITIS C AB TEST: CPT

## 2024-01-17 PROCEDURE — 71045 X-RAY EXAM CHEST 1 VIEW: CPT

## 2024-01-17 PROCEDURE — 84100 ASSAY OF PHOSPHORUS: CPT

## 2024-01-17 PROCEDURE — 85025 COMPLETE CBC W/AUTO DIFF WBC: CPT

## 2024-01-17 PROCEDURE — 87641 MR-STAPH DNA AMP PROBE: CPT

## 2024-01-17 PROCEDURE — 99231 SBSQ HOSP IP/OBS SF/LOW 25: CPT

## 2024-01-17 PROCEDURE — 82962 GLUCOSE BLOOD TEST: CPT

## 2024-01-17 PROCEDURE — 85610 PROTHROMBIN TIME: CPT

## 2024-01-17 PROCEDURE — 93005 ELECTROCARDIOGRAM TRACING: CPT

## 2024-01-17 PROCEDURE — 87640 STAPH A DNA AMP PROBE: CPT

## 2024-01-17 PROCEDURE — 70450 CT HEAD/BRAIN W/O DYE: CPT

## 2024-01-17 PROCEDURE — 72170 X-RAY EXAM OF PELVIS: CPT

## 2024-01-17 PROCEDURE — 83735 ASSAY OF MAGNESIUM: CPT

## 2024-01-17 PROCEDURE — 85730 THROMBOPLASTIN TIME PARTIAL: CPT

## 2024-01-17 PROCEDURE — 80048 BASIC METABOLIC PNL TOTAL CA: CPT

## 2024-01-17 RX ORDER — ACETAMINOPHEN 500 MG
3 TABLET ORAL
Qty: 0 | Refills: 0 | DISCHARGE
Start: 2024-01-17

## 2024-01-17 RX ORDER — RIVAROXABAN 15 MG-20MG
1 KIT ORAL
Refills: 0 | DISCHARGE

## 2024-01-17 RX ADMIN — Medication 50 MICROGRAM(S): at 05:47

## 2024-01-17 RX ADMIN — MEMANTINE HYDROCHLORIDE 5 MILLIGRAM(S): 10 TABLET ORAL at 11:10

## 2024-01-17 RX ADMIN — Medication 1: at 11:10

## 2024-01-17 NOTE — DIETITIAN NUTRITION RISK NOTIFICATION - ADDITIONAL COMMENTS/DIETITIAN RECOMMENDATIONS
Will trial sugar-free Gelatein once daily (80 kcal, 20g protein per serving) and no added sugar Magic Cup BID (280 kcal, 9g protein per serving).  Continue liberalized regular diet to maximize po intake. Monitor blood sugars and correct as needed. Rx: MVI daily. Encourage po intake, monitor diet tolerance, and provide assistance at meals as needed. Obtain daily weights to monitor trends.

## 2024-01-17 NOTE — DIETITIAN INITIAL EVALUATION ADULT - ETIOLOGY
related to inability to meet sufficient protein-energy in setting of Alzheimer's disease, lack of appetite, altered taste

## 2024-01-17 NOTE — DISCHARGE NOTE PROVIDER - CARE PROVIDER_API CALL
Fish Singh  Neurosurgery  [address] [place] [id number]  Phone: [phone]  Fax: [phone]  Follow Up Time:     Alexander Bell  Otolaryngology  [address] [place] [address], [facility] [address]  [place] [address]  Phone: [phone]  Fax: [phone]  Follow Up Time:

## 2024-01-17 NOTE — DISCHARGE NOTE PROVIDER - HOSPITAL COURSE
HPI: 74yo F w/ hx of RA, DM, HL, alzheimers, hypothyroidism presenting after mechanical fall and found to have SAH. Pt was walking in her home and slipped on the dog bed falling on her face. Endorses headstrike but denies LOC. Her daughter helped her up and brought her to ED for eval. Pt hemodynamically stable and underwent CT head and C-spine demonstrating small SAH and acute nondisplaced nasal bone fracture. She was transferred to [facility] for further eval. Pt in no distress with no acute complaints.    Hospital Course: Patient was transferred from [facility] and admitted to the SICU under Dr. Wilkins. Neurosurgery was consulted for SAH. 6 hour rpt CTH performed which was stable. q1h neuro checks were performed and remained neurologically intact throughout hospital stay. Xarelto was held and remained held for 2 weeks until follow up with NSX. 24 hour stability CTH performed which was stable as well. Patient was stable for downgrade to floor. ENT was consulted for nasal bone fx and recommended non-operative management with outpatient follow up. Tolerating regular diet well. Voiding spontaneously. Pain was well controlled at time of discharge. PT evaluated patient and recommended home w/ no need for additional services. Patient was stable for discharge.    HPI: 72yo F w/ hx of RA, DM, HL, alzheimers, hypothyroidism presenting after mechanical fall and found to have SAH. Pt was walking in her home and slipped on the dog bed falling on her face. Endorses headstrike but denies LOC. Her daughter helped her up and brought her to ED for eval. Pt hemodynamically stable and underwent CT head and C-spine demonstrating small SAH and acute nondisplaced nasal bone fracture. She was transferred to [facility] for further eval. Pt in no distress with no acute complaints.    Hospital Course: Patient was transferred from [facility] and admitted to the SICU under Dr. Wilkins. Neurosurgery was consulted for SAH. 6 hour rpt CTH performed which was stable. q1h neuro checks were performed and remained neurologically intact throughout hospital stay. Xarelto was held and recommendation is for it to remain held for atleast 2 weeks w/ instructions to follow-up with neurosurgeon and discuss risks / benefits of restarting. 24 hour stability CTH performed which was stable as well. CTH did show meningioma - pt made aware of finding and has established neurologist who she plans to follow-up with after discharge for further management. ENT was consulted for nasal bone fx and recommended non-operative management with outpatient follow up. Tolerating regular diet well. Voiding spontaneously. Pain was well controlled at time of discharge. PT evaluated patient and recommended home. Patient was stable for discharge.

## 2024-01-17 NOTE — DISCHARGE NOTE PROVIDER - NSDCCPCAREPLAN_GEN_ALL_CORE_FT
PRINCIPAL DISCHARGE DIAGNOSIS  Diagnosis: Subarachnoid hemorrhage, traumatic  Assessment and Plan of Treatment: Follow Up: Please call to make an appointment w/ Dr. Singh from Neurosurgery 2 weeks after discharge. Also, please call to make an appointment with your primary care physician as per your usual schedule.   Activity: May return to normal activities as tolerated.  Diet: May continue regular diet.  Medications: Please take all home medications as prescribed by your primary care doctor. You are encouraged to take over-the-counter tylenol and/or ibuprofen for pain relief.  Patient is advised to RETURN TO THE EMERGENCY DEPARTMENT for any of the following - worsening pain, fever/chills, nausea/vomiting, alterned mental status, chest pain, shortness of breath, or any other new/worsening symptoms.      SECONDARY DISCHARGE DIAGNOSES  Diagnosis: Nasal bone fracture  Assessment and Plan of Treatment: Please call to follow up with Dr. Bell as outpatient for follow up of nasal bone fracture.     PRINCIPAL DISCHARGE DIAGNOSIS  Diagnosis: Subarachnoid hemorrhage, traumatic  Assessment and Plan of Treatment: Follow Up: Please call to make an appointment w/ Dr. Singh from Neurosurgery 2 weeks after discharge. Also, please call to make an appointment with your primary care physician as per your usual schedule.   Activity: May return to normal activities as tolerated.  Diet: May continue regular diet.  Medications: Please take all home medications as prescribed by your primary care doctor EXCEPT Xarelto. Do not take Xarelto until you follow-up with the neurosurgeon and cardiologist and discuss risks / benefits of restarting. You are encouraged to take over-the-counter tylenol for pain relief, if needed.  Patient is advised to RETURN TO THE EMERGENCY DEPARTMENT for any of the following - worsening pain, fever/chills, nausea/vomiting, alterned mental status, chest pain, shortness of breath, or any other new/worsening symptoms.      SECONDARY DISCHARGE DIAGNOSES  Diagnosis: Nasal bone fracture  Assessment and Plan of Treatment: Please call to follow up with Dr. Bell as outpatient for follow up of nasal bone fracture.     PRINCIPAL DISCHARGE DIAGNOSIS  Diagnosis: Subarachnoid hemorrhage, traumatic  Assessment and Plan of Treatment: Follow Up: Please call to make an appointment w/ Dr. Singh from Neurosurgery 2 weeks after discharge. Also, please call to make an appointment with your primary care physician as per your usual schedule.   Activity: May return to normal activities as tolerated.  Diet: May continue regular diet.  Medications: Please take all home medications as prescribed by your primary care doctor EXCEPT Xarelto. Do not take Xarelto until you follow-up with the neurosurgeon and cardiologist and discuss risks / benefits of restarting. You are encouraged to take over-the-counter tylenol for pain relief, if needed.  Patient is advised to RETURN TO THE EMERGENCY DEPARTMENT for any of the following - worsening pain, fever/chills, nausea/vomiting, alterned mental status, chest pain, shortness of breath, or any other new/worsening symptoms.      SECONDARY DISCHARGE DIAGNOSES  Diagnosis: Nasal bone fracture  Assessment and Plan of Treatment: Please call to follow up with Dr. Bell as outpatient for follow up of nasal bone fracture.    Diagnosis: Meningioma  Assessment and Plan of Treatment: Please call and make a follow-up appointment to see your neurologist after discharge to discuss this finding and for further management.

## 2024-01-17 NOTE — DIETITIAN INITIAL EVALUATION ADULT - ADD RECOMMEND
Continue liberalized regular diet to maximize po intake. Monitor blood sugars and correct as needed. Rx: MVI daily. Encourage po intake, monitor diet tolerance, and provide assistance at meals as needed. Obtain daily weights to monitor trends.

## 2024-01-17 NOTE — PROGRESS NOTE ADULT - ASSESSMENT
Assessment: Patient is a 74 y/o female s/p fall sustaining a subarachnoid hemorrhage and nasal bone fx. Pain minimal and well controlled. Neurologically intact.    Plan:  - Neurosx recs noted - q4h neuro checks, hold Xarelto, outpt f/u  - Repeat CT head yesterday is stable   - ENT recs noted - outpt f/u with Dr. Bell, non-op mgmt  - Regular diet, tolerating well  - SCDs for DVT ppx  - Tylenol for pain control  - Encourage frequent IS use  - PT recs noted - recommendation is for home  - Discussed discharge planning w/ SW and CM. Plan for dc today  
Pt is a 74 y/o female s/p fall sustaining a subarachnoid hemorrhage and nasal bone fx. Pain minimal and well controlled. Neurologically intact  - Neurosx recs noted - q4h neuro checks, hold Xarelto, outpt f/u  - Repeat CT head ordered for today  - ENT recs noted - outpt f/u with Dr. Bell, non-op mgmt  - Regular diet  - SCDs for DVT ppx  - Tylenol for pain control  - Encourage frequent IS use  - PT recs noted - recommendation is for home  - Discussed discharge planning w/ SW and CM - pt lives alone and has had frequent falls. They will discuss with patient and her family / daughter how to ensure safe discharge plan

## 2024-01-17 NOTE — PROGRESS NOTE ADULT - ATTENDING COMMENTS
Patient seen and examined  Doing well  No new complaints   Wants to go home    - I discussed the incidental finding of a meningoma on her CT-head.  We discussed that she has a neurologist appointment already & she can follow up with them.   Will print out a copy of CT scan and give patient to take to her neurologist.
Patient seen and examined  No new complaints  Awake, alert, GCS=15  PARTIDA  Grossly neurologically intact    - Will check repeat CT scan today  - PT consult for safe discharge planning

## 2024-01-17 NOTE — DIETITIAN INITIAL EVALUATION ADULT - ORAL INTAKE PTA/DIET HISTORY
Nutrition assessment completed. Pt reports she is not eating much, states this is her usual and has been this way for the last two years. States both taste/texture of foods has been altered. Pt reports she lost 30 lbs unintentionally over the last year. States she drinks Boost at home, but dislikes Ensure supplements and does not want to receive. Pt has h/o DM but states well-controlled through diet. RD to follow up as feasible.

## 2024-01-17 NOTE — DISCHARGE NOTE PROVIDER - NSDCMRMEDTOKEN_GEN_ALL_CORE_FT
acetaminophen 325 mg oral tablet: 3 tab(s) orally every 6 hours As needed Mild Pain (1 - 3)  Jardiance 25 mg oral tablet: 1 tab(s) orally once a day  levothyroxine 50 mcg (0.05 mg) oral tablet: 1 tab(s) orally once a day  memantine 5 mg oral tablet: 1 tab(s) orally once a day  methotrexate 7.5 mg/0.3 mL subcutaneous solution: 7.5 milligram(s) subcutaneously once a week  rosuvastatin 20 mg oral capsule: 1 cap(s) orally once a day  sotalol 80 mg oral tablet: 1 tab(s) orally 2 times a day

## 2024-01-17 NOTE — PROGRESS NOTE ADULT - SUBJECTIVE AND OBJECTIVE BOX
Patient seen and examined at bedside. CLIVE, doing well. Pain controlled, tolerating a diet. No acute complaints this morning.     MEDICATIONS  (STANDING):  atorvastatin 80 milliGRAM(s) Oral at bedtime  dextrose 5%. 1000 milliLiter(s) (100 mL/Hr) IV Continuous <Continuous>  dextrose 5%. 1000 milliLiter(s) (50 mL/Hr) IV Continuous <Continuous>  dextrose 50% Injectable 25 Gram(s) IV Push once  dextrose 50% Injectable 12.5 Gram(s) IV Push once  dextrose 50% Injectable 25 Gram(s) IV Push once  glucagon  Injectable 1 milliGRAM(s) IntraMuscular once  insulin lispro (ADMELOG) corrective regimen sliding scale   SubCutaneous Before meals and at bedtime  levothyroxine 50 MICROGram(s) Oral daily  memantine 5 milliGRAM(s) Oral daily    MEDICATIONS  (PRN):  acetaminophen     Tablet .. 975 milliGRAM(s) Oral every 6 hours PRN Mild Pain (1 - 3)  dextrose Oral Gel 15 Gram(s) Oral once PRN Blood Glucose LESS THAN 70 milliGRAM(s)/deciliter      Vital Signs Last 24 Hrs  T(C): 36.8 (17 Jan 2024 05:08), Max: 36.9 (16 Jan 2024 21:00)  T(F): 98.2 (17 Jan 2024 05:08), Max: 98.4 (16 Jan 2024 21:00)  HR: 60 (17 Jan 2024 05:08) (52 - 82)  BP: 134/77 (17 Jan 2024 05:08) (134/77 - 158/99)  BP(mean): --  RR: 18 (17 Jan 2024 05:08) (18 - 18)  SpO2: 98% (17 Jan 2024 05:08) (95% - 98%)    Parameters below as of 17 Jan 2024 05:08  Patient On (Oxygen Delivery Method): room air                            14.6   9.05  )-----------( 171      ( 16 Jan 2024 04:35 )             44.8   01-16    142  |  104  |  22.1<H>  ----------------------------<  108<H>  4.7   |  24.0  |  0.87    Ca    9.2      16 Jan 2024 04:35  Phos  3.1     01-16  Mg     1.9     01-16      Physical exam:     Constitutional: patient resting comfortably in bed, in no acute distress  HEENT: L periorbital edema & ecchymosis, abrasion to forehead, ecchymosis above upper lip. No blood in nares or oral cavity.   Respiratory: respirations are unlabored  Cardiovascular: regular rate & rhythm  Gastrointestinal: abdomen soft, non-tender, non-distended, no / guarding  Neurological: GCS15, no focal deficits   Musculoskeletal: PARTIDA x 4 spontaneously without pain.    
SUBJECTIVE / 24H EVENTS: Patient seen and examined at bedside. She reports feeling well today. States pain to her face / head is minimal & well controlled w/ tylenol. Was OOB and ambulated w/ PT earlier today - reports she felt steady on her feet. Tolerating diet and voiding. Denies fever or chills, CP or SOB.    MEDICATIONS  (STANDING):  atorvastatin 80 milliGRAM(s) Oral at bedtime  dextrose 5%. 1000 milliLiter(s) (50 mL/Hr) IV Continuous <Continuous>  dextrose 5%. 1000 milliLiter(s) (100 mL/Hr) IV Continuous <Continuous>  dextrose 50% Injectable 25 Gram(s) IV Push once  dextrose 50% Injectable 25 Gram(s) IV Push once  dextrose 50% Injectable 12.5 Gram(s) IV Push once  glucagon  Injectable 1 milliGRAM(s) IntraMuscular once  insulin lispro (ADMELOG) corrective regimen sliding scale   SubCutaneous Before meals and at bedtime  levothyroxine 50 MICROGram(s) Oral daily  memantine 5 milliGRAM(s) Oral daily    MEDICATIONS  (PRN):  acetaminophen     Tablet .. 975 milliGRAM(s) Oral every 6 hours PRN Mild Pain (1 - 3)  dextrose Oral Gel 15 Gram(s) Oral once PRN Blood Glucose LESS THAN 70 milliGRAM(s)/deciliter      Vital Signs Last 24 Hrs  T(C): 36.5 (16 Jan 2024 12:25), Max: 37 (16 Jan 2024 00:05)  T(F): 97.7 (16 Jan 2024 12:25), Max: 98.6 (16 Jan 2024 00:05)  HR: 76 (16 Jan 2024 12:25) (50 - 82)  BP: 158/99 (16 Jan 2024 12:25) (119/51 - 158/99)  BP(mean): 95 (15 Anthony 2024 21:00) (70 - 111)  RR: 18 (16 Jan 2024 12:25) (15 - 23)  SpO2: 98% (16 Jan 2024 12:25) (94% - 100%)    Parameters below as of 16 Jan 2024 12:25  Patient On (Oxygen Delivery Method): room air        Constitutional: patient resting comfortably in bed, in no acute distress  HEENT: L periorbital edema & ecchymosis, abrasion to forehead, ecchymosis above upper lip. No blood in nares or oral cavity.   Respiratory: respirations are unlabored, no accessory muscle use, no conversational dyspnea  Cardiovascular: regular rate & rhythm  Gastrointestinal: abdomen soft, non-tender, non-distended, no / guarding  Neurological: GCS15, A&O x 3  Musculoskeletal: PARTIDA x 4 spontaneously, no point tenderness or deformities to extremities   Skin: mucous membranes moist, no diaphoresis, pallor, cyanosis or jaundice      I&O's Detail    15 Anthony 2024 07:01  -  16 Jan 2024 07:00  --------------------------------------------------------  IN:    Oral Fluid: 711 mL    sodium chloride 0.9%: 300 mL  Total IN: 1011 mL    OUT:    Voided (mL): 1600 mL  Total OUT: 1600 mL    Total NET: -589 mL          LABS:                        14.6   9.05  )-----------( 171      ( 16 Jan 2024 04:35 )             44.8     01-16    142  |  104  |  22.1<H>  ----------------------------<  108<H>  4.7   |  24.0  |  0.87    Ca    9.2      16 Jan 2024 04:35  Phos  3.1     01-16  Mg     1.9     01-16      PT/INR - ( 15 Anthony 2024 04:00 )   PT: 11.1 sec;   INR: 1.00 ratio         PTT - ( 15 Anthony 2024 04:00 )  PTT:34.3 sec  Urinalysis Basic - ( 16 Jan 2024 04:35 )    Color: x / Appearance: x / SG: x / pH: x  Gluc: 108 mg/dL / Ketone: x  / Bili: x / Urobili: x   Blood: x / Protein: x / Nitrite: x   Leuk Esterase: x / RBC: x / WBC x   Sq Epi: x / Non Sq Epi: x / Bacteria: x

## 2024-01-17 NOTE — DISCHARGE NOTE NURSING/CASE MANAGEMENT/SOCIAL WORK - PATIENT PORTAL LINK FT
You can access the FollowMyHealth Patient Portal offered by [facility] by registering at the following website: [email or web address]. By joining [employer]’s FollowMyHealth portal, you will also be able to view your health information using other applications (apps) compatible with our system.

## 2024-01-17 NOTE — DISCHARGE NOTE PROVIDER - CARE PROVIDERS DIRECT ADDRESSES
[email or web address]

## 2024-01-17 NOTE — DISCHARGE NOTE PROVIDER - NSDCFUSCHEDAPPT_GEN_ALL_CORE_FT
[facility]  GERIATRICS [address] [place] [address]   Scheduled Appointment: 01/18/2024    Sofy Bartlett  [facility]  ENDOCRIN [address]  Scheduled Appointment: 01/22/2024    Reid Watkins  [facility]  RHEUM [address]  Scheduled Appointment: 01/29/2024

## 2024-01-17 NOTE — DIETITIAN INITIAL EVALUATION ADULT - PERTINENT LABORATORY DATA
01-16    142  |  104  |  22.1<H>  ----------------------------<  108<H>  4.7   |  24.0  |  0.87    Ca    9.2      16 Jan 2024 04:35  Phos  3.1     01-16  Mg     1.9     01-16    POCT Blood Glucose.: 104 mg/dL (01-17-24 @ 07:41)  A1C with Estimated Average Glucose Result: 6.1 % (01-16-24 @ 04:35)

## 2024-01-17 NOTE — DIETITIAN INITIAL EVALUATION ADULT - ORAL NUTRITION SUPPLEMENTS
Will trial sugar-free Gelatein once daily (80 kcal, 20g protein per serving) and no added sugar Magic Cup BID (280 kcal, 9g protein per serving).

## 2024-01-17 NOTE — DISCHARGE NOTE PROVIDER - PROVIDER TOKENS
PROVIDER:[TOKEN:[[id number]]],PROVIDER:[TOKEN:[[id number]]] Spine appears normal, range of motion is not limited, no midline C/T/L spine tenderness, no increased tone.

## 2024-01-17 NOTE — DIETITIAN INITIAL EVALUATION ADULT - PERTINENT MEDS FT
MEDICATIONS  (STANDING):  atorvastatin 80 milliGRAM(s) Oral at bedtime  dextrose 5%. 1000 milliLiter(s) (100 mL/Hr) IV Continuous <Continuous>  dextrose 5%. 1000 milliLiter(s) (50 mL/Hr) IV Continuous <Continuous>  insulin lispro (ADMELOG) corrective regimen sliding scale   SubCutaneous Before meals and at bedtime  levothyroxine 50 MICROGram(s) Oral daily  memantine 5 milliGRAM(s) Oral daily

## 2024-01-17 NOTE — DIETITIAN INITIAL EVALUATION ADULT - OTHER INFO
74 y/o female PMH of HLD, Alzheimer's, RA, DM, hypothyroidism presents s/p fall sustaining a subarachnoid hemorrhage and nasal bone fx.

## 2024-01-17 NOTE — DISCHARGE NOTE PROVIDER - DETAILS OF MALNUTRITION DIAGNOSIS/DIAGNOSES
This patient has been assessed with a concern for Malnutrition and was treated during this hospitalization for the following Nutrition diagnosis/diagnoses:     -  01/17/2024: Moderate protein-calorie malnutrition

## 2024-01-18 ENCOUNTER — NON-APPOINTMENT (OUTPATIENT)
Age: 74
End: 2024-01-18

## 2024-01-18 ENCOUNTER — APPOINTMENT (OUTPATIENT)
Dept: GERIATRICS | Facility: CLINIC | Age: 74
End: 2024-01-18
Payer: MEDICARE

## 2024-01-18 VITALS — DIASTOLIC BLOOD PRESSURE: 75 MMHG | HEART RATE: 100 BPM | SYSTOLIC BLOOD PRESSURE: 123 MMHG

## 2024-01-18 VITALS
SYSTOLIC BLOOD PRESSURE: 89 MMHG | DIASTOLIC BLOOD PRESSURE: 62 MMHG | TEMPERATURE: 97.2 F | OXYGEN SATURATION: 99 % | BODY MASS INDEX: 20.83 KG/M2 | WEIGHT: 125 LBS | HEIGHT: 65 IN | HEART RATE: 153 BPM

## 2024-01-18 DIAGNOSIS — Z09 ENCOUNTER FOR FOLLOW-UP EXAMINATION AFTER COMPLETED TREATMENT FOR CONDITIONS OTHER THAN MALIGNANT NEOPLASM: ICD-10-CM

## 2024-01-18 DIAGNOSIS — R53.81 OTHER MALAISE: ICD-10-CM

## 2024-01-18 DIAGNOSIS — D50.9 IRON DEFICIENCY ANEMIA, UNSPECIFIED: ICD-10-CM

## 2024-01-18 PROBLEM — E03.9 HYPOTHYROIDISM, UNSPECIFIED: Chronic | Status: ACTIVE | Noted: 2024-01-15

## 2024-01-18 PROBLEM — Z87.39 PERSONAL HISTORY OF OTHER DISEASES OF THE MUSCULOSKELETAL SYSTEM AND CONNECTIVE TISSUE: Chronic | Status: ACTIVE | Noted: 2024-01-15

## 2024-01-18 PROBLEM — E78.5 HYPERLIPIDEMIA, UNSPECIFIED: Chronic | Status: ACTIVE | Noted: 2024-01-15

## 2024-01-18 PROBLEM — E11.9 TYPE 2 DIABETES MELLITUS WITHOUT COMPLICATIONS: Chronic | Status: ACTIVE | Noted: 2024-01-15

## 2024-01-18 PROBLEM — G30.9 ALZHEIMER'S DISEASE, UNSPECIFIED: Chronic | Status: ACTIVE | Noted: 2024-01-15

## 2024-01-18 PROCEDURE — 99496 TRANSJ CARE MGMT HIGH F2F 7D: CPT | Mod: 25

## 2024-01-18 NOTE — ASSESSMENT
[FreeTextEntry1] : Presenting for transition of care visit  Mechanical fall c/b head trauma and SAH, stable SAH, seeing neurosurgery for evaluation and recommendations, off Xarelto for 2 weeks as noted  Hypotension today in office, given water and rechecked, back to normal BP and slight tachycardia, holding precautions for sotalol discussed as well as adequate fluid hydration  Dementia worsening, more short term memory loss, especially with medication administration, famly now more involved, looking for HHA and retirement options, resources provided, Namenda 5 mg daily, [facility] neuro following  Dizziness as noted, following with ENT  Deconditioning from hospital, nutrition, PT referrals, fall precautions  Need to finish ACP docs at next visit  RTC in 4-6 weeks for full cognitive evaluation

## 2024-01-18 NOTE — CURRENT MEDS
[] : missed dose(s) of medications. Reason(s): [Forgets to take it] : forgets to take it [Yes] : Reviewed medication list for presence of high-risk medications. [Patient/caregiver able to verbalize understanding of medications, including indications and side effects] : Patient/caregiver able to verbalize understanding of medications, including indications and side effects

## 2024-01-18 NOTE — HISTORY OF PRESENT ILLNESS
[FreeTextEntry1] : 74 y/o F with PMHx of pAfib, CAD, DM, HLD, HTN, hypothyroidism, neuropathy, osteopenia, RA on MTX, cognitive decline, small berry aneurysm, vitamin D and B12 deficiency presenting to the practice for a transition of care visit.   Accompanied by daughter Ngoc Padilla, [phone]   LEYLA: Admitted to [facility] on 1/15/2024 and discharged on 1/17/2024. Admitted for mechanical fall and SAH. Patient was walking in her home and slipped on the dog bed falling on her face. Endorses headstrike but denies LOC. Her daughter helped her up and brought her to ED for eval. Pt hemodynamically stable and underwent CT head and C-spine demonstrating small SAH and acute nondisplaced nasal bone fracture. She was transferred to [facility] for further eval. Patient was transferred from [facility] and admitted to the SICU under Dr. Wilkins. Neurosurgery was consulted for SAH. 6 hour rpt CTH performed which was stable. q1h neuro checks were performed and remained neurologically intact throughout hospital stay. Xarelto was held and recommendation is for it to remain held for at least 2 weeks w/ instructions to follow-up with neurosurgeon and discuss risks / benefits of restarting. 24 hour stability CTH performed which was stable as well. CTH did show meningioma - patient made aware of finding and has established neurologist who she plans to follow-up with after discharge for further management. ENT was consulted for nasal bone fx and recommended non-operative management with outpatient follow up. Tolerating regular diet well. Voiding spontaneously. Pain was well controlled at time of discharge.  Today, she is feeling better but has a throbbing pain in her face and nose. She has not had any focal neurological signs. She has an upcoming appointment with neurosurgery in [place] for monitoring of the SAH. She is off of Xarelto now and will be off provisionally until January 26 or 27 (14 days total). She is also going to follow up with ENT for her nasal injury. Not specified but I advised patient to follow up with cards as well.    Alzheimer's mixed with vascular dementia: Following with neuro at [facility]. On Namenda 5 mg daily which she is tolerating well. Mostly short-term memory issues. Some sleep disturbance. Denies hallucinations, delusions, wandering, aphasia, agitation, aggression. Has a pending PET-CT with neuro. MRI showed some vascular findings. Has co-morbid cardiac history as noted. Otherwise, no new interval changes. Lives in structured senior living apartment. Close with her daughter and well connected with family.  Recently, daughter has noticed that the patient has a hard time keeping her routine and being increasingly forgetful of little details and dates. This was always present, but it has become more pronounced in the past few weeks. Most notably, patient is becoming increasingly forgetful with taking some of her medications.Patient is also complaining of dizziness which she at time denies which daughter is unsure if it is true or not. Patient is mostly against having a HHA but is open to trying since it is affecting her family. Daughter would also like to explore FPC's as well.    Dizziness: Has comorbid hearing loss as well. Follows with ENT. Should consider re-evaluation and possible vestibular therapy. Apparent dizziness which led to this previous fall.   Deconditioning: Patient has become noticeably weaker and not eating as much. This is all c/b recent hospital stay. Patient wants PT to get stronger. Home PT was referred previously but because patient lives in the [place], it was difficult to get appropriate services. Opting for PT center in [place].   ACP: Needs to redo MOLST form. [One fall no injury in past year] : Patient reported one fall in the past year without injury [Completely Independent] : Completely independent. [Patient is independent with] : bathing [Independent] : managing finances [Full assistance needed] : Assistance needed managing medications [Cane] : cane [Smoke Detector] : smoke detector [Carbon Monoxide Detector] : carbon monoxide detector [Guns at Home] : no guns at home [Stair Lift] : no stair lift used in home [Grab Bars] : grab bars [Shower Chair] : no shower chair [Night Light] : night light [Anti-Slip Measures] : anti-slip measures [Driving Concerns] : driving concerns noted [Wears Seat Belt] : wears seat belt [Wears Sunscreen] : wears sunscreen [de-identified] : has meals on wheels but has not been using it consistently, food goes rotten [de-identified] : has cleaning lady [FreeTextEntry7] : daughter noticing that she is forgetting to take some pills out of the pill box and there are extra pills left behind [Little interest or pleasure doing things] : 1) Little interest or pleasure doing things [Feeling down, depressed, or hopeless] : 2) Feeling down, depressed, or hopeless [0] : 2) Feeling down, depressed, or hopeless: Not at all (0) [PHQ-2 Negative - No further assessment needed] : PHQ-2 Negative - No further assessment needed [[id number]] : 0 [Moderate] : Stage: Moderate [Worse] : Status: Worse [Memory Lapses Or Loss] : worsened memory impairment [Patient Observed To Be Agitated] : denies agitation [Hostility Toward Caregivers] : denies aggression [Sleep Disturbances] : stable sleep disturbances [] : stable wandering [Fixed Beliefs Contradicted By Reality (Delusions)] : denies delusions [Difficulty Finding Desired Words] : denies difficulty finding desired words [de-identified] : Increased forgetfulness with taking medications

## 2024-01-18 NOTE — PHYSICAL EXAM
[Alert] : alert [No Acute Distress] : in no acute distress [Normal Outer Ear/Nose] : the ears and nose were normal in appearance [Normal Appearance] : the appearance of the neck was normal [Supple] : the neck was supple [No Respiratory Distress] : no respiratory distress [No Acc Muscle Use] : no accessory muscle use [Respiration, Rhythm And Depth] : normal respiratory rhythm and effort [Auscultation Breath Sounds / Voice Sounds] : lungs were clear to auscultation bilaterally [Heart Rate And Rhythm] : heart rate was normal and rhythm regular [Carotids Normal] : carotid pulses were normal with no bruits [Bowel Sounds] : normal bowel sounds [Abdomen Tenderness] : non-tender [Abdomen Soft] : soft [No Spinal Tenderness] : no spinal tenderness [Normal Turgor] : normal skin turgor [No Focal Deficits] : no focal deficits [Normal Affect] : the affect was normal [Normal Mood] : the mood was normal [de-identified] : Residual bruising note surrounding the L orbit inferiorly and superiorly extending into the L forehead and the L side of the nasal bridge

## 2024-01-22 ENCOUNTER — APPOINTMENT (OUTPATIENT)
Dept: ENDOCRINOLOGY | Facility: CLINIC | Age: 74
End: 2024-01-22
Payer: MEDICARE

## 2024-01-22 ENCOUNTER — NON-APPOINTMENT (OUTPATIENT)
Age: 74
End: 2024-01-22

## 2024-01-22 VITALS
WEIGHT: 130 LBS | HEART RATE: 41 BPM | DIASTOLIC BLOOD PRESSURE: 60 MMHG | HEIGHT: 65 IN | OXYGEN SATURATION: 98 % | SYSTOLIC BLOOD PRESSURE: 128 MMHG | BODY MASS INDEX: 21.66 KG/M2

## 2024-01-22 PROCEDURE — 99214 OFFICE O/P EST MOD 30 MIN: CPT

## 2024-01-22 NOTE — PHYSICAL EXAM
[Alert] : alert [Well Nourished] : well nourished [No Acute Distress] : no acute distress [Well Developed] : well developed [Normal Sclera/Conjunctiva] : normal sclera/conjunctiva [EOMI] : extra ocular movement intact [No Proptosis] : no proptosis [Normal Oropharynx] : the oropharynx was normal [Thyroid Not Enlarged] : the thyroid was not enlarged [No Thyroid Nodules] : no palpable thyroid nodules [No Respiratory Distress] : no respiratory distress [No Accessory Muscle Use] : no accessory muscle use [Clear to Auscultation] : lungs were clear to auscultation bilaterally [Normal S1, S2] : normal S1 and S2 [Normal Rate] : heart rate was normal [Regular Rhythm] : with a regular rhythm [No Edema] : no peripheral edema [Pedal Pulses Normal] : the pedal pulses are present [Normal Bowel Sounds] : normal bowel sounds [Not Tender] : non-tender [Not Distended] : not distended [Soft] : abdomen soft [No Tremors] : no tremors [Oriented x3] : oriented to person, place, and time [Acanthosis Nigricans] : no acanthosis nigricans independent

## 2024-01-22 NOTE — ASSESSMENT
[FreeTextEntry1] : 72 yo woman with diagnoses of DM2, HTN, HLD and overweight and hypoT and Afib. She has neuropathy. She states she has malabsorption. alzeheimer.  She tried MF IR and severe diarrhea.  recent fall with face injury and minimal SHB .   DM2 : a1c 6.1 cont  jardiance 10 mg qd she has diarrhea 2x week  evelyn/c ratio normal. diabetic neuropathy: continue with B12 supplement encouraged more exercise walking 30 min 3 x week  HypoT / Hashimoto's Tsh better 5.6. cont Lt4 50 mcg qd   HTN: bp at target on meds. Continue current management.  HLD: lipids very good. cont statin  Overweight: discussed diet and exercise   Osteopenia with FRAX 14/ 3.4 %.  continue actonel monthly.  cont vit d supplements and calcium 500 mg BID

## 2024-01-24 ENCOUNTER — NON-APPOINTMENT (OUTPATIENT)
Age: 74
End: 2024-01-24

## 2024-01-24 ENCOUNTER — RX RENEWAL (OUTPATIENT)
Age: 74
End: 2024-01-24

## 2024-01-26 PROBLEM — D50.9 IRON DEFICIENCY ANEMIA: Status: ACTIVE | Noted: 2024-01-26

## 2024-01-26 LAB
ALBUMIN SERPL ELPH-MCNC: 5 G/DL
ALP BLD-CCNC: 77 U/L
ALT SERPL-CCNC: 10 U/L
ANION GAP SERPL CALC-SCNC: 14 MMOL/L
AST SERPL-CCNC: 21 U/L
BILIRUB SERPL-MCNC: 0.5 MG/DL
BUN SERPL-MCNC: 16 MG/DL
CALCIUM SERPL-MCNC: 9.5 MG/DL
CHLORIDE SERPL-SCNC: 102 MMOL/L
CHOLEST SERPL-MCNC: 148 MG/DL
CO2 SERPL-SCNC: 25 MMOL/L
CREAT SERPL-MCNC: 0.81 MG/DL
CREAT SPEC-SCNC: 71 MG/DL
EGFR: 76 ML/MIN/1.73M2
ESTIMATED AVERAGE GLUCOSE: 131 MG/DL
GLUCOSE SERPL-MCNC: 111 MG/DL
HBA1C MFR BLD HPLC: 6.2 %
HCT VFR BLD CALC: 45 %
HDLC SERPL-MCNC: 56 MG/DL
HGB BLD-MCNC: 13.8 G/DL
LDLC SERPL CALC-MCNC: 68 MG/DL
MCHC RBC-ENTMCNC: 28.7 PG
MCHC RBC-ENTMCNC: 30.7 GM/DL
MCV RBC AUTO: 93.6 FL
MICROALBUMIN 24H UR DL<=1MG/L-MCNC: <1.2 MG/DL
MICROALBUMIN/CREAT 24H UR-RTO: NORMAL MG/G
NONHDLC SERPL-MCNC: 92 MG/DL
PLATELET # BLD AUTO: 258 K/UL
POTASSIUM SERPL-SCNC: 4.7 MMOL/L
PROT SERPL-MCNC: 7 G/DL
RBC # BLD: 4.81 M/UL
RBC # FLD: 14.1 %
SODIUM SERPL-SCNC: 141 MMOL/L
TRIGL SERPL-MCNC: 139 MG/DL
WBC # FLD AUTO: 7.99 K/UL

## 2024-01-29 ENCOUNTER — APPOINTMENT (OUTPATIENT)
Dept: RHEUMATOLOGY | Facility: CLINIC | Age: 74
End: 2024-01-29
Payer: MEDICARE

## 2024-01-29 VITALS
TEMPERATURE: 95.4 F | OXYGEN SATURATION: 97 % | SYSTOLIC BLOOD PRESSURE: 162 MMHG | HEIGHT: 65 IN | WEIGHT: 129 LBS | BODY MASS INDEX: 21.49 KG/M2 | DIASTOLIC BLOOD PRESSURE: 70 MMHG | HEART RATE: 50 BPM

## 2024-01-29 DIAGNOSIS — Z79.899 OTHER LONG TERM (CURRENT) DRUG THERAPY: ICD-10-CM

## 2024-01-29 DIAGNOSIS — M06.9 RHEUMATOID ARTHRITIS, UNSPECIFIED: ICD-10-CM

## 2024-01-29 PROCEDURE — G2211 COMPLEX E/M VISIT ADD ON: CPT

## 2024-01-29 PROCEDURE — 99214 OFFICE O/P EST MOD 30 MIN: CPT

## 2024-01-29 NOTE — PHYSICAL EXAM
[TextEntry] : GENERAL: Appears in no acute distress HEENT: EOMI, PERRLA. No conjunctival erythema. Moist mucous membranes. No nasopharyngeal ulcers NECK: Supple, no cervical lymphadenopathy, no thyromegaly CARDIOVASCULAR: RRR. S1, S2 auscultated. No murmurs or rubs. PULMONARY: Clear to auscultation b/l, no wheezes, rales, or crackles ABDOMINAL: Soft, nontender, nondistended. Bowel sounds present. No organomegaly. MSK: No active synovitis, swelling, erythema, or warmth. Mild R 3rd Randy's node Normal ROM of neck, back, b/l upper and lower extremities. SKIN: No lesions or rashes NEURO: No focal deficits. PSYCH: AAOx3. Normal affect and thought process.

## 2024-01-29 NOTE — HISTORY OF PRESENT ILLNESS
[FreeTextEntry1] : HISTORY:  74 y/o female with T2DM, hypothyroidism, HTN, HLD, neuropathy, AFib on Xarelto presents as follow up for RA.  Pt was diagnosed with RA in 2014 with joint inflammation of hands, wrists, shoulders, neck. Pt was treated with steroids but developed diabetes. Pt has been on MTX for a long time with resolution of her RA flares. Pt only reports R 3rd Randy's nodes as a long term consequence of RA.  Pt's prior rheumatologist Dr. De La Fuente in [place] ([facility]) no longer takes her insurance. Last seen by Dr. De La Fuente on 9/2022. Pt is on MTX 7 tabs/week with daily folic acid.   Pt has clear Hx of RA well controlled with MTX for almost 10 years. Pt without any major joint inflammation at this time. MTX was decreased without worsening of RA.  Pt has been started on Boniva by me in 7/2023 for clinical osteoporosis (high FRAX score)   INTERVAL HISTORY:  Pt had a fall on her head (possibly tripping on rug, but has been having more dizziness recently) 2 weeks ago with small brain bleed. Did not fall on any other part of the body and did not have any fractures. Denies any RA flares, even after being off MTX for few weeks after fall. Pt continues on MTX 4 tabs/week and Boniva.  WORKUP:  XR L hip (8/2023): General osteopenia but otherwise normal  DXA (4/2023): Osteopenia. Lowest T-score -1.6 femoral neck. FRAX major 14%, hip 3.4%

## 2024-01-29 NOTE — ASSESSMENT
[FreeTextEntry1] : 74 y/o female with T2DM, hypothyroidism, HTN, HLD, neuropathy, AFib on Xarelto presents as follow up for RA.  Pt was diagnosed with RA in 2014 with joint inflammation of hands, wrists, shoulders, neck. Pt was treated with steroids but developed diabetes. Pt has been on MTX for a long time with resolution of her RA flares. Pt only reports R 3rd Randy's nodes as a long term consequence of RA.  Pt's prior rheumatologist Dr. De La Fuente in [place] ([facility]) no longer takes her insurance. Last seen by Dr. De La Fuente on 9/2022. On pt's first visit with me, pt was on MTX 7 tabs/week with daily folic acid.   Pt has clear Hx of RA well controlled with MTX for almost 10 years. Pt without any major joint inflammation at this time. MTX is being weaned down without any flares of RA. Pt has been started on Boniva by me in 7/2023 for clinical osteoporosis (high FRAX score)   - Last medication safety labs 12/2023. - Trial off MTX 4 tabs/week and folic acid. Pt to call me for any flares of RA, will restart MTX 4 tabs/week.  - Last Hep B/C, Quantiferon TB negative in 1/2023. Will repeat if decision to restart MTX - c/w Boniva 150mg QMonthly for osteopenia with high FRAX score. Side effects of bisphosphonates were discussed with the pt in detail including bone pain and flu-like illness x 2-3 days, ONJ, atypical femoral fractures.  Advised to take on empty stomach in morning with full glass of water and to remain upright and NPO for 30-60 mins.  Avoid if CrCl < 30, malabsorption, plan for invasive oral procedure, pregnancy, breastfeeding.  - Last DXA 4/2023. Next DXA due 4/2025  - RTC 6 months for follow up.

## 2024-01-31 LAB
25(OH)D3 SERPL-MCNC: 40 NG/ML
FERRITIN SERPL-MCNC: 227 NG/ML

## 2024-02-05 LAB
VIT B1 SERPL-MCNC: 151.8 NMOL/L
VIT B6 SERPL-MCNC: 7.6 UG/L

## 2024-02-06 ENCOUNTER — APPOINTMENT (OUTPATIENT)
Dept: NEUROSURGERY | Facility: CLINIC | Age: 74
End: 2024-02-06
Payer: MEDICARE

## 2024-02-06 VITALS
HEART RATE: 48 BPM | HEIGHT: 65 IN | SYSTOLIC BLOOD PRESSURE: 151 MMHG | RESPIRATION RATE: 18 BRPM | WEIGHT: 128 LBS | DIASTOLIC BLOOD PRESSURE: 68 MMHG | BODY MASS INDEX: 21.33 KG/M2 | OXYGEN SATURATION: 98 %

## 2024-02-06 DIAGNOSIS — S06.6XAA TRAUMATIC SUBARACHNOID HEMORRHAGE WITH LOSS OF CONSCIOUSNESS STATUS UNKNOWN, INITIAL ENCOUNTER: ICD-10-CM

## 2024-02-06 DIAGNOSIS — I62.9 NONTRAUMATIC INTRACRANIAL HEMORRHAGE, UNSPECIFIED: ICD-10-CM

## 2024-02-06 PROCEDURE — 99214 OFFICE O/P EST MOD 30 MIN: CPT

## 2024-02-06 NOTE — PHYSICAL EXAM
[General Appearance - Alert] : alert [General Appearance - In No Acute Distress] : in no acute distress [Oriented To Time, Place, And Person] : oriented to person, place, and time [Impaired Insight] : insight and judgment were intact [Affect] : the affect was normal [Person] : oriented to person [Place] : oriented to place [Time] : oriented to time [Short Term Intact] : short term memory intact [Remote Intact] : remote memory intact [Span Intact] : the attention span was normal [Concentration Intact] : normal concentrating ability [Fluency] : fluency intact [Comprehension] : comprehension intact [Current Events] : adequate knowledge of current events [Past History] : adequate knowledge of personal past history [Vocabulary] : adequate range of vocabulary [Cranial Nerves Optic (II)] : visual acuity intact bilaterally,  pupils equal round and reactive to light [Cranial Nerves Oculomotor (III)] : extraocular motion intact [Cranial Nerves Trigeminal (V)] : facial sensation intact symmetrically [Cranial Nerves Facial (VII)] : face symmetrical [Cranial Nerves Vestibulocochlear (VIII)] : hearing was intact bilaterally [Cranial Nerves Glossopharyngeal (IX)] : tongue and palate midline [Cranial Nerves Accessory (XI - Cranial And Spinal)] : head turning and shoulder shrug symmetric [Cranial Nerves Hypoglossal (XII)] : there was no tongue deviation with protrusion [Motor Tone] : muscle tone was normal in all four extremities [Motor Strength] : muscle strength was normal in all four extremities [No Muscle Atrophy] : normal bulk in all four extremities [Sensation Tactile Decrease] : light touch was intact [Abnormal Walk] : normal gait [Balance] : balance was intact [Past-pointing] : there was no past-pointing [Tremor] : no tremor present [Antalgic] : antalgic

## 2024-02-06 NOTE — REASON FOR VISIT
[FreeTextEntry1] : Hospital Course:  Discharge Date 17-Jan-2024  Admission Date 15-Anthony-2024 02:08  Reason for Admission SAH  Hospital Course   HPI: 74yo F w/ hx of RA, DM, HL, alzheimers, hypothyroidism presenting after  mechanical fall and found to have SAH. Pt was walking in her home and slipped  on the dog bed falling on her face. Endorses headstrike but denies LOC. Her  daughter helped her up and brought her to ED for eval. Pt hemodynamically  stable and underwent CT head and C-spine demonstrating small SAH and acute  nondisplaced nasal bone fracture. She was transferred to [facility] for further eval.  Pt in no distress with no acute complaints.  Today she presents accompanied by her daugher She admits to  headaches, n/v. Vison changes include floaters.  She has not had any seizures.   Denies any numbness/tingling or weakness of extremities.   Ambulating independently.  No balance or gait disturbances.   No unexplained falls.

## 2024-02-06 NOTE — REVIEW OF SYSTEMS
[As Noted in HPI] : as noted in HPI [Numbness] : no numbness [Tingling] : no tingling [Tension Headache] : tension-type headaches [Negative] : Heme/Lymph

## 2024-02-06 NOTE — ASSESSMENT
[FreeTextEntry1] : Ms. Villavicencio presents with above history and imaging.  She is neurologically intact at today' visit. She continues to have mild headaches. And left visual floaters since discharge.   Plan: Repeat CT head w/o contrast now.  Ophthalmology for formal visual exam  Follow up with neurology - dementia. and meningioma Patient has been given an opportunity to ask and have their questions answered to their satisfaction. Patient knows to call the office if there are any new or worsening symptoms.

## 2024-02-06 NOTE — DATA REVIEWED
[de-identified] : ACC: [id number] EXAM: CT BRAIN ORDERED BY: KAYLA GIBBS  PROCEDURE DATE: 01/16/2024    INTERPRETATION: .  CLINICAL INFORMATION: Trauma. Evaluate for subarachnoid hemorrhage.  TECHNIQUE: Multiple axial CT images of the head were obtained without contrast. Sagittal and coronal reconstructed images were acquired from the source data.  COMPARISON: Prior head CT exam from 1:15 224. Prior brain MRI study from 8/25/2021.  FINDINGS: There is no acute intracranial hemorrhage, mass effect, shift of the midline structures, herniation, extra-axial fluid collection, or hydrocephalus.  There is an unchanged appearing partially calcified extra axial mass off the right anterior parietal convexity measuring up to 9 mm.  There is diffuse cerebral volume loss with prominence of the sulci, fissures, and cisternal spaces which is normal for the patient's age. There is minimal deep and periventricular white matter hypoattenuation statistically compatible with microvascular changes given calcific atherosclerotic disease of the intracranial arteries.  The paranasal sinuses and tympanomastoid cavities are clear. The calvarium is intact. A mild amount of left-sided frontal scalp soft tissue swelling is noted. The imaged orbits are unremarkable.  IMPRESSION: No acute intracranial hemorrhage, mass effect, or shift of the midline structures.  Unchanged 9 mm right-sided convexity partially calcified meningioma.  --- End of Report ---

## 2024-02-12 ENCOUNTER — RX RENEWAL (OUTPATIENT)
Age: 74
End: 2024-02-12

## 2024-02-15 ENCOUNTER — RESULT REVIEW (OUTPATIENT)
Age: 74
End: 2024-02-15

## 2024-02-15 ENCOUNTER — APPOINTMENT (OUTPATIENT)
Dept: CT IMAGING | Facility: CLINIC | Age: 74
End: 2024-02-15
Payer: MEDICARE

## 2024-02-15 PROCEDURE — 70450 CT HEAD/BRAIN W/O DYE: CPT

## 2024-02-20 ENCOUNTER — NON-APPOINTMENT (OUTPATIENT)
Age: 74
End: 2024-02-20

## 2024-02-20 ENCOUNTER — APPOINTMENT (OUTPATIENT)
Dept: GERIATRICS | Facility: CLINIC | Age: 74
End: 2024-02-20
Payer: MEDICARE

## 2024-02-20 VITALS
BODY MASS INDEX: 21.49 KG/M2 | HEIGHT: 65 IN | WEIGHT: 129 LBS | RESPIRATION RATE: 16 BRPM | OXYGEN SATURATION: 99 % | SYSTOLIC BLOOD PRESSURE: 154 MMHG | DIASTOLIC BLOOD PRESSURE: 70 MMHG | TEMPERATURE: 97.5 F | HEART RATE: 48 BPM

## 2024-02-20 DIAGNOSIS — Z11.1 ENCOUNTER FOR SCREENING FOR RESPIRATORY TUBERCULOSIS: ICD-10-CM

## 2024-02-20 PROCEDURE — 99483 ASSMT & CARE PLN PT COG IMP: CPT

## 2024-02-20 RX ORDER — METHOTREXATE 2.5 MG/1
2.5 TABLET ORAL
Qty: 48 | Refills: 1 | Status: DISCONTINUED | COMMUNITY
Start: 2021-05-11 | End: 2024-02-20

## 2024-02-22 PROBLEM — Z11.1 SCREENING FOR TUBERCULOSIS: Status: ACTIVE | Noted: 2024-02-20

## 2024-02-23 ENCOUNTER — NON-APPOINTMENT (OUTPATIENT)
Age: 74
End: 2024-02-23

## 2024-02-23 LAB
M TB IFN-G BLD-IMP: NEGATIVE
QUANTIFERON TB PLUS MITOGEN MINUS NIL: 1.13 IU/ML
QUANTIFERON TB PLUS NIL: 0.02 IU/ML
QUANTIFERON TB PLUS TB1 MINUS NIL: 0 IU/ML
QUANTIFERON TB PLUS TB2 MINUS NIL: 0 IU/ML

## 2024-03-12 RX ORDER — IBANDRONATE SODIUM 150 MG/1
150 TABLET ORAL
Qty: 1 | Refills: 3 | Status: ACTIVE | COMMUNITY
Start: 2023-07-27 | End: 1900-01-01

## 2024-03-12 RX ORDER — ROSUVASTATIN CALCIUM 20 MG/1
20 TABLET, FILM COATED ORAL
Qty: 90 | Refills: 3 | Status: ACTIVE | COMMUNITY
Start: 2022-05-09 | End: 1900-01-01

## 2024-03-12 RX ORDER — RIVAROXABAN 20 MG/1
20 TABLET, FILM COATED ORAL
Qty: 30 | Refills: 8 | Status: ACTIVE | COMMUNITY
Start: 2021-05-11 | End: 1900-01-01

## 2024-03-12 RX ORDER — PEN NEEDLE, DIABETIC 29 G X1/2"
32G X 4 MM NEEDLE, DISPOSABLE MISCELLANEOUS
Qty: 100 | Refills: 1 | Status: ACTIVE | COMMUNITY
Start: 2021-06-01 | End: 1900-01-01

## 2024-03-12 RX ORDER — FOLIC ACID 1 MG/1
1 TABLET ORAL
Qty: 90 | Refills: 1 | Status: DISCONTINUED | COMMUNITY
Start: 2021-05-11 | End: 2024-03-12

## 2024-03-12 RX ORDER — MEMANTINE HYDROCHLORIDE 5 MG/1
5 TABLET, FILM COATED ORAL DAILY
Qty: 90 | Refills: 0 | Status: ACTIVE | COMMUNITY
Start: 2023-12-14 | End: 1900-01-01

## 2024-03-12 RX ORDER — EMPAGLIFLOZIN 10 MG/1
10 TABLET, FILM COATED ORAL
Qty: 90 | Refills: 1 | Status: ACTIVE | COMMUNITY
Start: 2022-05-04 | End: 1900-01-01

## 2024-03-12 RX ORDER — SOTALOL HYDROCHLORIDE 80 MG/1
80 TABLET ORAL
Qty: 180 | Refills: 3 | Status: ACTIVE | COMMUNITY
Start: 2021-05-11 | End: 1900-01-01

## 2024-03-12 RX ORDER — LANCETS
EACH MISCELLANEOUS
Qty: 100 | Refills: 0 | Status: DISCONTINUED | COMMUNITY
Start: 2021-06-01 | End: 2024-03-12

## 2024-03-12 RX ORDER — LANCETS 33 GAUGE
EACH MISCELLANEOUS
Qty: 100 | Refills: 1 | Status: DISCONTINUED | COMMUNITY
Start: 2024-02-12 | End: 2024-03-12

## 2024-03-26 ENCOUNTER — APPOINTMENT (OUTPATIENT)
Dept: GERIATRICS | Facility: CLINIC | Age: 74
End: 2024-03-26
Payer: MEDICARE

## 2024-03-26 DIAGNOSIS — R09.82 POSTNASAL DRIP: ICD-10-CM

## 2024-03-26 DIAGNOSIS — R52 PAIN, UNSPECIFIED: ICD-10-CM

## 2024-03-26 DIAGNOSIS — F12.90 CANNABIS USE, UNSPECIFIED, UNCOMPLICATED: ICD-10-CM

## 2024-03-26 PROCEDURE — 99442: CPT

## 2024-03-26 PROCEDURE — G2211 COMPLEX E/M VISIT ADD ON: CPT | Mod: NC,1L

## 2024-03-26 RX ORDER — ACETAMINOPHEN 500 MG/1
500 TABLET, COATED ORAL EVERY 8 HOURS
Qty: 180 | Refills: 0 | Status: ACTIVE | OUTPATIENT
Start: 2024-03-26

## 2024-03-26 RX ORDER — IPRATROPIUM BROMIDE 21 UG/1
0.03 SPRAY NASAL 3 TIMES DAILY
Qty: 1 | Refills: 2 | Status: ACTIVE | COMMUNITY
Start: 2024-03-26 | End: 1900-01-01

## 2024-04-10 ENCOUNTER — NON-APPOINTMENT (OUTPATIENT)
Age: 74
End: 2024-04-10

## 2024-04-10 RX ORDER — SERTRALINE 25 MG/1
25 TABLET, FILM COATED ORAL DAILY
Qty: 90 | Refills: 0 | Status: DISCONTINUED | COMMUNITY
Start: 2023-12-14 | End: 2024-04-10

## 2024-04-15 ENCOUNTER — APPOINTMENT (OUTPATIENT)
Dept: GERIATRICS | Facility: CLINIC | Age: 74
End: 2024-04-15
Payer: MEDICARE

## 2024-04-15 VITALS
DIASTOLIC BLOOD PRESSURE: 79 MMHG | BODY MASS INDEX: 23.13 KG/M2 | RESPIRATION RATE: 14 BRPM | OXYGEN SATURATION: 98 % | WEIGHT: 139 LBS | SYSTOLIC BLOOD PRESSURE: 158 MMHG | HEART RATE: 48 BPM | TEMPERATURE: 97.6 F

## 2024-04-15 DIAGNOSIS — F41.9 ANXIETY DISORDER, UNSPECIFIED: ICD-10-CM

## 2024-04-15 DIAGNOSIS — Z51.5 ENCOUNTER FOR PALLIATIVE CARE: ICD-10-CM

## 2024-04-15 DIAGNOSIS — F32.A ANXIETY DISORDER, UNSPECIFIED: ICD-10-CM

## 2024-04-15 DIAGNOSIS — Z23 ENCOUNTER FOR IMMUNIZATION: ICD-10-CM

## 2024-04-15 DIAGNOSIS — M81.0 AGE-RELATED OSTEOPOROSIS W/OUT CURRENT PATHOLOGICAL FRACTURE: ICD-10-CM

## 2024-04-15 PROCEDURE — 99205 OFFICE O/P NEW HI 60 MIN: CPT

## 2024-04-23 ENCOUNTER — APPOINTMENT (OUTPATIENT)
Dept: ENDOCRINOLOGY | Facility: CLINIC | Age: 74
End: 2024-04-23

## 2024-04-26 ENCOUNTER — TRANSCRIPTION ENCOUNTER (OUTPATIENT)
Age: 74
End: 2024-04-26

## 2024-05-05 PROBLEM — Z23 ENCOUNTER FOR IMMUNIZATION: Status: ACTIVE | Noted: 2023-12-13 | Resolved: 2024-05-19

## 2024-05-05 PROBLEM — M81.0 OSTEOPOROSIS: Status: ACTIVE | Noted: 2024-01-29

## 2024-05-05 PROBLEM — Z51.5 ENCOUNTER FOR PALLIATIVE CARE: Status: ACTIVE | Noted: 2024-05-05

## 2024-05-05 PROBLEM — F41.9 ANXIETY AND DEPRESSION: Status: ACTIVE | Noted: 2024-05-05
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
## 2024-05-05 NOTE — REASON FOR VISIT
[Consultation] : a consultation visit [Family Member] : family member [FreeTextEntry1] : Medical Marijuana

## 2024-05-05 NOTE — PHYSICAL EXAM
[General Appearance - Alert] : alert [General Appearance - Well Nourished] : well nourished [General Appearance - In No Acute Distress] : in no acute distress [General Appearance - Well Developed] : well developed [Sclera] : the sclera and conjunctiva were normal [PERRL With Normal Accommodation] : pupils were equal in size, round, and reactive to light [Outer Ear] : the ears and nose were normal in appearance [Neck Appearance] : the appearance of the neck was normal [] : no respiratory distress [Heart Rate And Rhythm] : heart rate was normal and rhythm regular [Bowel Sounds] : normal bowel sounds [Edema] : there was no peripheral edema [Involuntary Movements] : no involuntary movements were seen [Skin Color & Pigmentation] : normal skin color and pigmentation [No Focal Deficits] : no focal deficits [Affect] : the affect was normal [Impaired Insight] : insight and judgment were intact [Mood] : the mood was normal

## 2024-05-06 ENCOUNTER — APPOINTMENT (OUTPATIENT)
Dept: ENDOCRINOLOGY | Facility: CLINIC | Age: 74
End: 2024-05-06
Payer: MEDICARE

## 2024-05-06 ENCOUNTER — APPOINTMENT (OUTPATIENT)
Dept: CARDIOLOGY | Facility: CLINIC | Age: 74
End: 2024-05-06

## 2024-05-06 VITALS
WEIGHT: 138 LBS | BODY MASS INDEX: 22.99 KG/M2 | RESPIRATION RATE: 14 BRPM | TEMPERATURE: 98 F | OXYGEN SATURATION: 99 % | SYSTOLIC BLOOD PRESSURE: 110 MMHG | HEIGHT: 65 IN | DIASTOLIC BLOOD PRESSURE: 70 MMHG | HEART RATE: 60 BPM

## 2024-05-06 DIAGNOSIS — E03.9 HYPOTHYROIDISM, UNSPECIFIED: ICD-10-CM

## 2024-05-06 DIAGNOSIS — E78.5 TYPE 2 DIABETES MELLITUS WITH OTHER SPECIFIED COMPLICATION: ICD-10-CM

## 2024-05-06 DIAGNOSIS — E11.9 TYPE 2 DIABETES MELLITUS W/OUT COMPLICATIONS: ICD-10-CM

## 2024-05-06 DIAGNOSIS — E11.69 TYPE 2 DIABETES MELLITUS WITH OTHER SPECIFIED COMPLICATION: ICD-10-CM

## 2024-05-06 LAB
ALBUMIN SERPL ELPH-MCNC: 4.3 G/DL
ALP BLD-CCNC: 72 U/L
ALT SERPL-CCNC: 11 U/L
ANION GAP SERPL CALC-SCNC: 14 MMOL/L
AST SERPL-CCNC: 19 U/L
BILIRUB SERPL-MCNC: 0.5 MG/DL
BUN SERPL-MCNC: 19 MG/DL
CALCIUM SERPL-MCNC: 9 MG/DL
CHLORIDE SERPL-SCNC: 103 MMOL/L
CHOLEST SERPL-MCNC: 146 MG/DL
CO2 SERPL-SCNC: 23 MMOL/L
CREAT SERPL-MCNC: 0.96 MG/DL
CREAT SPEC-SCNC: 73 MG/DL
EGFR: 61 ML/MIN/1.73M2
ESTIMATED AVERAGE GLUCOSE: 137 MG/DL
GLUCOSE SERPL-MCNC: 118 MG/DL
HBA1C MFR BLD HPLC: 6.4 %
HCT VFR BLD CALC: 44.3 %
HDLC SERPL-MCNC: 66 MG/DL
HGB BLD-MCNC: 13.4 G/DL
LDLC SERPL CALC-MCNC: 62 MG/DL
MCHC RBC-ENTMCNC: 27.9 PG
MCHC RBC-ENTMCNC: 30.2 GM/DL
MCV RBC AUTO: 92.1 FL
MICROALBUMIN 24H UR DL<=1MG/L-MCNC: <1.2 MG/DL
MICROALBUMIN/CREAT 24H UR-RTO: NORMAL MG/G
NONHDLC SERPL-MCNC: 80 MG/DL
PLATELET # BLD AUTO: 176 K/UL
POTASSIUM SERPL-SCNC: 4.7 MMOL/L
PROT SERPL-MCNC: 6.6 G/DL
RBC # BLD: 4.81 M/UL
RBC # FLD: 14.3 %
SODIUM SERPL-SCNC: 140 MMOL/L
T4 FREE SERPL-MCNC: 1.3 NG/DL
TRIGL SERPL-MCNC: 95 MG/DL
TSH SERPL-ACNC: 9.04 UIU/ML
WBC # FLD AUTO: 8.26 K/UL

## 2024-05-06 PROCEDURE — G2211 COMPLEX E/M VISIT ADD ON: CPT

## 2024-05-06 PROCEDURE — 99214 OFFICE O/P EST MOD 30 MIN: CPT

## 2024-05-06 RX ORDER — LEVOTHYROXINE SODIUM 0.05 MG/1
50 TABLET ORAL
Qty: 90 | Refills: 1 | Status: DISCONTINUED | COMMUNITY
Start: 2021-05-11 | End: 2024-05-06

## 2024-05-06 RX ORDER — BLOOD-GLUCOSE METER
W/DEVICE EACH MISCELLANEOUS
Qty: 1 | Refills: 0 | Status: ACTIVE | COMMUNITY
Start: 2024-05-06 | End: 1900-01-01

## 2024-05-06 NOTE — ASSESSMENT
[FreeTextEntry1] : 73 yo woman with diagnoses of DM2, HTN, HLD and overweight and hypoT and Afib. She has neuropathy. Alzheimer accompanied by daughter. .  She tried MF IR and severe diarrhea. Repeated falls.   DM2 : a1c 6.4 cont  jardiance 10 mg qd evelyn/c ratio normal. diabetic neuropathy: continue with B12 supplement encouraged more exercise walking 30 min 3 x week  HypoT / Hashimoto's Tsh better 9. Increase Lt4 to 75 mcg qd   repeat tfts in 3 mos.   HTN: bp at target on meds. Continue current management.  HLD: lipids very good. cont statin  Overweight: discussed diet and exercise   Osteopenia with FRAX 14/ 3.4 %.  continue actonel monthly.  cont vit d supplements and calcium 500 mg BID

## 2024-05-08 RX ORDER — LEVOTHYROXINE SODIUM 0.07 MG/1
75 TABLET ORAL
Qty: 90 | Refills: 1 | Status: ACTIVE | COMMUNITY
Start: 2024-05-06 | End: 1900-01-01

## 2024-05-13 RX ORDER — LANCETS 33 GAUGE
EACH MISCELLANEOUS
Qty: 3 | Refills: 1 | Status: ACTIVE | COMMUNITY
Start: 2021-11-08 | End: 1900-01-01

## 2024-05-13 RX ORDER — BLOOD SUGAR DIAGNOSTIC
STRIP MISCELLANEOUS 3 TIMES DAILY
Qty: 3 | Refills: 1 | Status: ACTIVE | COMMUNITY
Start: 2021-09-01 | End: 1900-01-01

## 2024-05-16 ENCOUNTER — APPOINTMENT (OUTPATIENT)
Dept: GERIATRICS | Facility: CLINIC | Age: 74
End: 2024-05-16
Payer: MEDICARE

## 2024-05-16 VITALS
OXYGEN SATURATION: 98 % | BODY MASS INDEX: 23.7 KG/M2 | WEIGHT: 142.25 LBS | HEIGHT: 65 IN | HEART RATE: 52 BPM | TEMPERATURE: 96.4 F | SYSTOLIC BLOOD PRESSURE: 126 MMHG | DIASTOLIC BLOOD PRESSURE: 58 MMHG

## 2024-05-16 DIAGNOSIS — R26.81 UNSTEADINESS ON FEET: ICD-10-CM

## 2024-05-16 DIAGNOSIS — E11.59 TYPE 2 DIABETES MELLITUS WITH OTHER CIRCULATORY COMPLICATIONS: ICD-10-CM

## 2024-05-16 DIAGNOSIS — F02.A3 ALZHEIMER'S DISEASE WITH LATE ONSET: ICD-10-CM

## 2024-05-16 DIAGNOSIS — F33.40 MAJOR DEPRESSIVE DISORDER, RECURRENT, IN REMISSION, UNSPECIFIED: ICD-10-CM

## 2024-05-16 DIAGNOSIS — I15.2 TYPE 2 DIABETES MELLITUS WITH OTHER CIRCULATORY COMPLICATIONS: ICD-10-CM

## 2024-05-16 DIAGNOSIS — G30.1 ALZHEIMER'S DISEASE WITH LATE ONSET: ICD-10-CM

## 2024-05-16 PROCEDURE — 99214 OFFICE O/P EST MOD 30 MIN: CPT

## 2024-05-16 PROCEDURE — G2211 COMPLEX E/M VISIT ADD ON: CPT

## 2024-05-17 PROBLEM — R26.81 GAIT INSTABILITY: Status: ACTIVE | Noted: 2023-12-13

## 2024-05-17 PROBLEM — E11.59 HYPERTENSION ASSOCIATED WITH TYPE 2 DIABETES MELLITUS: Status: ACTIVE | Noted: 2023-12-14

## 2024-05-17 PROBLEM — F33.40 RECURRENT MAJOR DEPRESSIVE DISORDER, IN REMISSION: Status: ACTIVE | Noted: 2023-12-14

## 2024-05-17 PROBLEM — G30.1 MILD LATE ONSET ALZHEIMER'S DEMENTIA WITH MOOD DISTURBANCE: Status: ACTIVE | Noted: 2023-12-13

## 2024-05-17 RX ORDER — RESPIRATORY SYNCYTIAL VISUS VACCINE RECOMBINANT, ADJUVANTED 120MCG/0.5
120 KIT INTRAMUSCULAR
Qty: 1 | Refills: 0 | Status: DISCONTINUED | COMMUNITY
Start: 2023-12-13 | End: 2024-05-17

## 2024-05-17 NOTE — ASSESSMENT
[FreeTextEntry1] : RTC in 3 months for follow up visit.  Total time spent: 30 mins This includes chart review, patient assessment, discussion and collaboration with interdisciplinary team members, excluding time spent on separately billable services.

## 2024-05-17 NOTE — HISTORY OF PRESENT ILLNESS
[FreeTextEntry1] : 72 y/o F with PMHx of pAfib, CAD, DM, HLD, HTN, hypothyroidism, neuropathy, osteopenia, RA on MTX, cognitive decline, small berry aneurysm, vitamin D and B12 deficiency presenting to the practice for a follow up visit.   Accompanied by daughter/HCP, Ngoc Padilla ([phone]).  Alzheimer's mixed with vascular dementia: Following with neuro at [facility]. On Namenda 5 mg twice daily which she is tolerating well. Mostly short-term memory issues which daughter feels has been slowly declining. Sleep has improved with MM. Denies hallucinations, delusions, wandering, aphasia, agitation, aggression. Has a pending PET-FDG with neuro. MRI showed some vascular findings. Has co-morbid cardiac history as noted. Otherwise, no new interval changes. Close with her daughter and well connected with family. MOCA: 21/30, GDS: 3/15. Moved to [facility] in [place], [place] which has significantly improved patient's life.  Mechanical fall: [facility] on April 25 and discharged on April 26. Evaluated for fall and head strike, neuro workup was unremarkable. Sotalol was held due to low HR but she otherwise was fine. Sustained a small abrasion on the bridge of her nose. She had missed a step while going up stairs but did not feel lightheaded or dizzy at the time of fall. Spoke with Dr. Maradiaga for cards about DC'ing sotalol but he says it is okay and she should continue it. Appointment pending next Monday. May be related to eyes as she is wearing reading glasses, may need regular distance glasses. Will see eye doctor.  Anxiety: Better with MM. Doing well, adjusting well to [facility]. Sertraline 50 mg is helping.  DM: Following with endocrine. Last A1c was 6.4%. Controlled on Jardiance.  Afib: Stable on sotalol and Xarelto. Following with cards.  HTN: Stable on Sotalol and Crestor.  Osteoporosis: On ibandronate, following with end.  Hypothyroidism: TSH is okay, on levothyroxine.  HCM: Lung nodule from screening CT-scan in Jan 2024, repeat in Jan 2025. [Two or more falls in past year] : Patient reported two or more falls in the past year [Completely Independent] : Completely independent. [Patient is independent with] : bathing [Independent] : housekeeping [Full assistance needed] : Assistance needed managing medications [FAST Score: ____] : Functional Assessment Scale (FAST) Score: [unfilled] [Cane] : cane [Smoke Detector] : smoke detector [Carbon Monoxide Detector] : carbon monoxide detector [Guns at Home] : no guns at home [Stair Lift] : no stair lift used in home [Grab Bars] : grab bars [Shower Chair] : no shower chair [Night Light] : night light [Anti-Slip Measures] : anti-slip measures [Driving Concerns] : driving concerns noted [Wears Seat Belt] : wears seat belt [Wears Sunscreen] : wears sunscreen [Little interest or pleasure doing things] : 1) Little interest or pleasure doing things [Feeling down, depressed, or hopeless] : 2) Feeling down, depressed, or hopeless [0] : 2) Feeling down, depressed, or hopeless: Not at all (0) [PHQ-2 Negative - No further assessment needed] : PHQ-2 Negative - No further assessment needed [[id number]] : 0 [Mild] : Stage: Mild [Worse] : Status: Worse [Memory Lapses Or Loss] : worsened memory impairment [Patient Observed To Be Agitated] : stable agitation [Hostility Toward Caregivers] : stable aggression [Sleep Disturbances] : improved sleep disturbances [] : stable wandering [Fixed Beliefs Contradicted By Reality (Delusions)] : denies delusions [Difficulty Finding Desired Words] : denies difficulty finding desired words [None] : The patient is currently asymptomatic

## 2024-05-17 NOTE — PHYSICAL EXAM

## 2024-05-20 ENCOUNTER — NON-APPOINTMENT (OUTPATIENT)
Age: 74
End: 2024-05-20

## 2024-05-20 ENCOUNTER — APPOINTMENT (OUTPATIENT)
Dept: CARDIOLOGY | Facility: CLINIC | Age: 74
End: 2024-05-20
Payer: MEDICARE

## 2024-05-20 VITALS
WEIGHT: 145 LBS | DIASTOLIC BLOOD PRESSURE: 68 MMHG | OXYGEN SATURATION: 97 % | SYSTOLIC BLOOD PRESSURE: 125 MMHG | BODY MASS INDEX: 24.16 KG/M2 | HEART RATE: 45 BPM | HEIGHT: 65 IN | RESPIRATION RATE: 17 BRPM

## 2024-05-20 DIAGNOSIS — I10 ESSENTIAL (PRIMARY) HYPERTENSION: ICD-10-CM

## 2024-05-20 DIAGNOSIS — I25.10 ATHEROSCLEROTIC HEART DISEASE OF NATIVE CORONARY ARTERY W/OUT ANGINA PECTORIS: ICD-10-CM

## 2024-05-20 DIAGNOSIS — I48.91 UNSPECIFIED ATRIAL FIBRILLATION: ICD-10-CM

## 2024-05-20 DIAGNOSIS — E78.00 PURE HYPERCHOLESTEROLEMIA, UNSPECIFIED: ICD-10-CM

## 2024-05-20 PROCEDURE — 93000 ELECTROCARDIOGRAM COMPLETE: CPT

## 2024-05-20 PROCEDURE — 99214 OFFICE O/P EST MOD 30 MIN: CPT

## 2024-05-20 NOTE — DISCUSSION/SUMMARY
[FreeTextEntry1] : Ms Villavicencio has no current complaints.  Her exam shows regular rhythm at a rate of about 50, repeat blood pressure of 130/70, clear lungs, and a normal cardiac exam.  Her EKG shows rhythm with a T wave inversion in V2 and is unchanged.  She is stable and doing well.  She will continue on her current regimen of sotalol 80 twice daily, Xarelto, Jardiance, rosuvastatin, metformin.  She will follow-up in 6 months. [EKG obtained to assist in diagnosis and management of assessed problem(s)] : EKG obtained to assist in diagnosis and management of assessed problem(s)

## 2024-05-20 NOTE — HISTORY OF PRESENT ILLNESS
[FreeTextEntry1] : 74-year-old female with a history of nonobstructive CAD, paroxysmal atrial fibrillation, hypertension, hypercholesterolemia, diabetes, mild dementia.  She was last seen in 11/23.  She is living in assisted living.  She has fallen twice over the winter and was briefly hospitalized last month.  She did not lose consciousness and has not had any palpitations.  Her sotalol was briefly held in the hospital because of "slow heart rate" but was restarted when she returned to assisted living at my request.  She is currently asymptomatic.  Her last blood work showed an LDL cholesterol of 62 and an A1c of 6.4.

## 2024-05-31 RX ORDER — CYANOCOBALAMIN (VITAMIN B-12) 1000 MCG
1000 TABLET ORAL DAILY
Qty: 90 | Refills: 3 | Status: ACTIVE | COMMUNITY
Start: 2024-03-12 | End: 1900-01-01

## 2024-05-31 RX ORDER — CHOLECALCIFEROL (VITAMIN D3) 25 MCG
25 MCG TABLET ORAL
Qty: 90 | Refills: 3 | Status: ACTIVE | COMMUNITY
Start: 2024-03-12 | End: 1900-01-01

## 2024-06-04 ENCOUNTER — APPOINTMENT (OUTPATIENT)
Dept: OPHTHALMOLOGY | Facility: CLINIC | Age: 74
End: 2024-06-04
Payer: MEDICARE

## 2024-06-04 ENCOUNTER — NON-APPOINTMENT (OUTPATIENT)
Age: 74
End: 2024-06-04

## 2024-06-04 PROCEDURE — 92134 CPTRZ OPH DX IMG PST SGM RTA: CPT

## 2024-06-04 PROCEDURE — 92014 COMPRE OPH EXAM EST PT 1/>: CPT

## 2024-06-10 RX ORDER — SERTRALINE HYDROCHLORIDE 50 MG/1
50 TABLET, FILM COATED ORAL
Qty: 30 | Refills: 1 | Status: ACTIVE | COMMUNITY
Start: 2024-04-10 | End: 1900-01-01

## 2024-07-18 ENCOUNTER — NON-APPOINTMENT (OUTPATIENT)
Age: 74
End: 2024-07-18

## 2024-07-19 ENCOUNTER — NON-APPOINTMENT (OUTPATIENT)
Age: 74
End: 2024-07-19

## 2024-07-19 ENCOUNTER — APPOINTMENT (OUTPATIENT)
Dept: OPHTHALMOLOGY | Facility: CLINIC | Age: 74
End: 2024-07-19
Payer: MEDICARE

## 2024-07-19 PROCEDURE — 92136 OPHTHALMIC BIOMETRY: CPT

## 2024-07-19 PROCEDURE — 92014 COMPRE OPH EXAM EST PT 1/>: CPT

## 2024-07-23 DIAGNOSIS — D64.9 ANEMIA, UNSPECIFIED: ICD-10-CM

## 2024-07-25 ENCOUNTER — APPOINTMENT (OUTPATIENT)
Dept: GERIATRICS | Facility: CLINIC | Age: 74
End: 2024-07-25
Payer: MEDICARE

## 2024-07-25 DIAGNOSIS — H25.9 UNSPECIFIED AGE-RELATED CATARACT: ICD-10-CM

## 2024-07-25 DIAGNOSIS — I48.91 UNSPECIFIED ATRIAL FIBRILLATION: ICD-10-CM

## 2024-07-25 DIAGNOSIS — Z01.818 ENCOUNTER FOR OTHER PREPROCEDURAL EXAMINATION: ICD-10-CM

## 2024-07-25 DIAGNOSIS — E11.9 TYPE 2 DIABETES MELLITUS W/OUT COMPLICATIONS: ICD-10-CM

## 2024-07-25 PROCEDURE — 99214 OFFICE O/P EST MOD 30 MIN: CPT

## 2024-07-25 PROCEDURE — G2211 COMPLEX E/M VISIT ADD ON: CPT

## 2024-07-26 PROBLEM — Z01.818 PREOPERATIVE CLEARANCE: Status: ACTIVE | Noted: 2024-07-26

## 2024-07-26 PROBLEM — H25.9 SENILE CATARACT OF LEFT EYE, UNSPECIFIED AGE-RELATED CATARACT TYPE: Status: ACTIVE | Noted: 2024-07-26

## 2024-07-26 LAB
ALBUMIN SERPL ELPH-MCNC: 4.6 G/DL
ALP BLD-CCNC: 66 U/L
ALT SERPL-CCNC: 11 U/L
ANION GAP SERPL CALC-SCNC: 14 MMOL/L
AST SERPL-CCNC: 22 U/L
BASOPHILS # BLD AUTO: 0.07 K/UL
BASOPHILS NFR BLD AUTO: 1 %
BILIRUB SERPL-MCNC: 0.5 MG/DL
BUN SERPL-MCNC: 24 MG/DL
CALCIUM SERPL-MCNC: 9.7 MG/DL
CHLORIDE SERPL-SCNC: 103 MMOL/L
CO2 SERPL-SCNC: 23 MMOL/L
CREAT SERPL-MCNC: 0.9 MG/DL
EGFR: 66 ML/MIN/1.73M2
EOSINOPHIL # BLD AUTO: 0.29 K/UL
EOSINOPHIL NFR BLD AUTO: 4 %
GLUCOSE SERPL-MCNC: 144 MG/DL
HCT VFR BLD CALC: 44.8 %
HGB BLD-MCNC: 14.3 G/DL
IMM GRANULOCYTES NFR BLD AUTO: 0.3 %
LYMPHOCYTES # BLD AUTO: 1.65 K/UL
LYMPHOCYTES NFR BLD AUTO: 22.7 %
MAN DIFF?: NORMAL
MCHC RBC-ENTMCNC: 28 PG
MCHC RBC-ENTMCNC: 31.9 GM/DL
MCV RBC AUTO: 87.8 FL
MONOCYTES # BLD AUTO: 0.55 K/UL
MONOCYTES NFR BLD AUTO: 7.6 %
NEUTROPHILS # BLD AUTO: 4.69 K/UL
NEUTROPHILS NFR BLD AUTO: 64.4 %
PLATELET # BLD AUTO: 166 K/UL
POTASSIUM SERPL-SCNC: 4.4 MMOL/L
PROT SERPL-MCNC: 6.7 G/DL
RBC # BLD: 5.1 M/UL
RBC # FLD: 13.5 %
SODIUM SERPL-SCNC: 140 MMOL/L
WBC # FLD AUTO: 7.27 K/UL

## 2024-07-26 NOTE — PHYSICAL EXAM
[Alert] : alert [No Acute Distress] : in no acute distress [Normal Outer Ear/Nose] : the ears and nose were normal in appearance [Normal Appearance] : the appearance of the neck was normal [No Respiratory Distress] : no respiratory distress [No Acc Muscle Use] : no accessory muscle use [Respiration, Rhythm And Depth] : normal respiratory rhythm and effort [Normal Color / Pigmentation] : normal skin color and pigmentation [No Focal Deficits] : no focal deficits [Normal Affect] : the affect was normal [Normal Mood] : the mood was normal

## 2024-07-26 NOTE — ASSESSMENT
[FreeTextEntry1] : Patient is medically cleared to proceed with surgical procedure. Please feel free to reach out to my office with any questions or concerns.  RTC in 2 to 3 weeks for follow up visit.  Total time spent: 30 mins This includes chart review, patient assessment, discussion and collaboration with interdisciplinary team members, excluding time spent on separately billable services.

## 2024-07-26 NOTE — HISTORY OF PRESENT ILLNESS
[Two or more falls in past year] : Patient reported two or more falls in the past year [Completely Independent] : Completely independent. [Patient is independent with] : bathing [Independent] : housekeeping [Full assistance needed] : Assistance needed managing medications [] : Assistance needed managing finances. [FAST Score: ____] : Functional Assessment Scale (FAST) Score: [unfilled] [Cane] : cane [Smoke Detector] : smoke detector [Carbon Monoxide Detector] : carbon monoxide detector [Grab Bars] : grab bars [Night Light] : night light [Anti-Slip Measures] : anti-slip measures [Driving Concerns] : driving concerns noted [Wears Seat Belt] : wears seat belt [Wears Sunscreen] : wears sunscreen [NO] : No [Little interest or pleasure doing things] : 1) Little interest or pleasure doing things [Feeling down, depressed, or hopeless] : 2) Feeling down, depressed, or hopeless [0] : 2) Feeling down, depressed, or hopeless: Not at all (0) [PHQ-2 Negative - No further assessment needed] : PHQ-2 Negative - No further assessment needed [FreeTextEntry1] : 72 y/o F with PMHx of pAfib, CAD, DM, HLD, HTN, hypothyroidism, neuropathy, osteopenia, RA on MTX, cognitive decline, small berry aneurysm, vitamin D and B12 deficiency presenting to the practice for a follow up visit.   Accompanied by daughter/HCP, Ngoc Padilla ([phone]).  Pre-operative clearance:  Surgeon: Dr. Hardy Procedure: L eye cataract repair Location: [facility] Date: 07/31/2024  METS: Easily able to attain 4+ METS w/o chest pain, palpitations, acute respiratory distress.  RCRI: Elevated risk: no CAD/IHD: yes (1+) CHF: no CVA: no Insulin: no Cr > 2.0: no  Class II risk: 6.0% of 30-day risk of death, MI, or cardiac arrest  Anesthesia: No adverse reactions noted to local or general anesthesia as per patient and her daughter.  AC: Hold Xarelto 2 days before and morning of procedure.  Diabetes: Hold Jardiance 3 days before and morning of procedure.  ~~~~~~~~~~~~~~~~~~~~~~~  Dementia: Following with neuro at [facility]. On memantine 5 mg daily which she is tolerating well. Mostly short-term memory issues which daughter feels has been slowly declining. Sleep has improved with MM. Denies hallucinations, delusions, wandering, aphasia, agitation, aggression. MOCA: 21/30, GDS: 3/15. Moved to [facility] in [place], [place] which has significantly improved patient's life.  Anxiety: Better with MM. Stable on sertraline. No SI/SA.  DM: Following with endocrine. Last A1c was 6.4%. Controlled on Jardiance.  Afib: Stable on sotalol and Xarelto. Following with cards.  HTN: Stable on Sotalol and Crestor.  Osteoporosis: On ibandronate, following with endo.  Hypothyroidism: TSH is okay, on levothyroxine.  HCM: Lung nodule from screening CT-scan in Jan 2024, repeat in Jan 2025. [Stair Lift] : no stair lift used in home [Shower Chair] : no shower chair [[id number]] : 0

## 2024-07-26 NOTE — REASON FOR VISIT
[Follow-Up] : a follow-up visit [Home] : at home, [unfilled] , at the time of the visit. [Medical Office: ([facility])___] : at the medical office located in  [Family Member] : family member [Patient] : the patient [Self] : self

## 2024-07-29 ENCOUNTER — APPOINTMENT (OUTPATIENT)
Dept: RHEUMATOLOGY | Facility: CLINIC | Age: 74
End: 2024-07-29
Payer: MEDICARE

## 2024-07-29 VITALS
HEART RATE: 40 BPM | BODY MASS INDEX: 23.66 KG/M2 | TEMPERATURE: 96.6 F | OXYGEN SATURATION: 97 % | HEIGHT: 65 IN | DIASTOLIC BLOOD PRESSURE: 69 MMHG | WEIGHT: 142 LBS | SYSTOLIC BLOOD PRESSURE: 154 MMHG

## 2024-07-29 DIAGNOSIS — M06.9 RHEUMATOID ARTHRITIS, UNSPECIFIED: ICD-10-CM

## 2024-07-29 DIAGNOSIS — M81.0 AGE-RELATED OSTEOPOROSIS W/OUT CURRENT PATHOLOGICAL FRACTURE: ICD-10-CM

## 2024-07-29 DIAGNOSIS — Z79.899 OTHER LONG TERM (CURRENT) DRUG THERAPY: ICD-10-CM

## 2024-07-29 PROCEDURE — 99214 OFFICE O/P EST MOD 30 MIN: CPT

## 2024-07-29 PROCEDURE — G2211 COMPLEX E/M VISIT ADD ON: CPT

## 2024-07-29 NOTE — HISTORY OF PRESENT ILLNESS
[FreeTextEntry1] : HISTORY:  75 y/o female with T2DM, hypothyroidism, HTN, HLD, neuropathy, AFib on Xarelto presents as follow up for RA and osteoporosis.  Pt was diagnosed with RA in 2014 with joint inflammation of hands, wrists, shoulders, neck. Pt was treated with steroids but developed diabetes. Pt has been on MTX for a long time with resolution of her RA flares. Pt only reports R 3rd Randy's nodes as a long term consequence of RA.  Pt's prior rheumatologist Dr. De La Fuente in [place] ([facility]) no longer takes her insurance. Last seen by Dr. De La Fuente on 9/2022. On pt's first visit with me, pt was on MTX 7 tabs/week with daily folic acid.   Pt has clear Hx of RA well controlled with MTX for almost 10 years. Pt without any major joint inflammation at this time. MTX has been weaned off without any flares of RA.  Pt has been started on Boniva by me in 7/2023 for clinical osteoporosis (high FRAX score).   INTERVAL HISTORY:  Pt has been off MTX since 1/2024 without any changes in symptoms. Pt reports low back pain which she had for a long time which is likely mechanical. Pt continues on Bonvia without any issues  WORKUP:  XR L hip (8/2023): General osteopenia but otherwise normal  DXA (4/2023): Osteopenia. Lowest T-score -1.6 femoral neck. FRAX major 14%, hip 3.4%

## 2024-07-29 NOTE — ASSESSMENT
[FreeTextEntry1] : 75 y/o female with T2DM, hypothyroidism, HTN, HLD, neuropathy, AFib on Xarelto presents as follow up for RA and osteoporosis.  Pt was diagnosed with RA in 2014 with joint inflammation of hands, wrists, shoulders, neck. Pt was treated with steroids but developed diabetes. Pt has been on MTX for a long time with resolution of her RA flares. Pt only reports R 3rd Randy's nodes as a long term consequence of RA.  Pt's prior rheumatologist Dr. De La Fuente in [place] ([facility]) no longer takes her insurance. Last seen by Dr. De La Fuente on 9/2022. On pt's first visit with me, pt was on MTX 7 tabs/week with daily folic acid.   Pt has clear Hx of RA well controlled with MTX for almost 10 years. Pt without any major joint inflammation at this time. MTX has been weaned off without any flares of RA.  Pt has been started on Boniva by me in 7/2023 for clinical osteoporosis (high FRAX score).   Pt reports low back pain which she had for a long time which is likely mechanical.  - Last medication safety labs 7/2024.  - Stay off MTX given RA likely now in remission. Pt to call for any concerns for RA flares. - Last Hep B/C, Quantiferon TB negative in 1/2023. Will repeat if decision to restart MTX  - c/w Boniva 150mg QMonthly for osteopenia with high FRAX score. Side effects of bisphosphonates were discussed with the pt in detail including bone pain and flu-like illness x 2-3 days, ONJ, atypical femoral fractures.  Advised to take on empty stomach in morning with full glass of water and to remain upright and NPO for 30-60 mins.  Avoid if CrCl < 30, malabsorption, plan for invasive oral procedure, pregnancy, breastfeeding.  - Last DXA 4/2024. Next DXA due after 4/2025. Pt can call for script prior to next visit. - RTC 1 year for follow up.

## 2024-07-31 ENCOUNTER — NON-APPOINTMENT (OUTPATIENT)
Age: 74
End: 2024-07-31

## 2024-07-31 ENCOUNTER — APPOINTMENT (OUTPATIENT)
Dept: OPHTHALMOLOGY | Facility: HOSPITAL | Age: 74
End: 2024-07-31
Payer: MEDICARE

## 2024-07-31 PROCEDURE — 66984 XCAPSL CTRC RMVL W/O ECP: CPT | Mod: LT

## 2024-08-01 ENCOUNTER — NON-APPOINTMENT (OUTPATIENT)
Age: 74
End: 2024-08-01

## 2024-08-01 ENCOUNTER — APPOINTMENT (OUTPATIENT)
Dept: OPHTHALMOLOGY | Facility: CLINIC | Age: 74
End: 2024-08-01
Payer: MEDICARE

## 2024-08-01 PROCEDURE — 99024 POSTOP FOLLOW-UP VISIT: CPT

## 2024-08-05 ENCOUNTER — LABORATORY RESULT (OUTPATIENT)
Age: 74
End: 2024-08-05

## 2024-08-06 ENCOUNTER — NON-APPOINTMENT (OUTPATIENT)
Age: 74
End: 2024-08-06

## 2024-08-06 ENCOUNTER — LABORATORY RESULT (OUTPATIENT)
Age: 74
End: 2024-08-06

## 2024-08-06 ENCOUNTER — APPOINTMENT (OUTPATIENT)
Dept: OPHTHALMOLOGY | Facility: CLINIC | Age: 74
End: 2024-08-06

## 2024-08-06 PROCEDURE — 92014 COMPRE OPH EXAM EST PT 1/>: CPT | Mod: 24

## 2024-08-06 PROCEDURE — 92136 OPHTHALMIC BIOMETRY: CPT | Mod: 26,RT

## 2024-08-14 ENCOUNTER — NON-APPOINTMENT (OUTPATIENT)
Age: 74
End: 2024-08-14

## 2024-08-14 ENCOUNTER — APPOINTMENT (OUTPATIENT)
Dept: OPHTHALMOLOGY | Facility: HOSPITAL | Age: 74
End: 2024-08-14
Payer: MEDICARE

## 2024-08-14 PROCEDURE — 66984 XCAPSL CTRC RMVL W/O ECP: CPT | Mod: 79,RT

## 2024-08-15 ENCOUNTER — APPOINTMENT (OUTPATIENT)
Dept: OPHTHALMOLOGY | Facility: CLINIC | Age: 74
End: 2024-08-15
Payer: MEDICARE

## 2024-08-15 ENCOUNTER — NON-APPOINTMENT (OUTPATIENT)
Age: 74
End: 2024-08-15

## 2024-08-15 PROCEDURE — 99024 POSTOP FOLLOW-UP VISIT: CPT

## 2024-08-16 ENCOUNTER — APPOINTMENT (OUTPATIENT)
Dept: GERIATRICS | Facility: CLINIC | Age: 74
End: 2024-08-16

## 2024-08-21 ENCOUNTER — APPOINTMENT (OUTPATIENT)
Dept: GERIATRICS | Facility: CLINIC | Age: 74
End: 2024-08-21
Payer: MEDICARE

## 2024-08-21 VITALS
TEMPERATURE: 96.5 F | OXYGEN SATURATION: 99 % | WEIGHT: 142.5 LBS | HEART RATE: 51 BPM | DIASTOLIC BLOOD PRESSURE: 79 MMHG | HEIGHT: 65 IN | BODY MASS INDEX: 23.74 KG/M2 | SYSTOLIC BLOOD PRESSURE: 139 MMHG

## 2024-08-21 DIAGNOSIS — G30.1 ALZHEIMER'S DISEASE WITH LATE ONSET: ICD-10-CM

## 2024-08-21 DIAGNOSIS — F33.40 MAJOR DEPRESSIVE DISORDER, RECURRENT, IN REMISSION, UNSPECIFIED: ICD-10-CM

## 2024-08-21 DIAGNOSIS — F02.A3 ALZHEIMER'S DISEASE WITH LATE ONSET: ICD-10-CM

## 2024-08-21 DIAGNOSIS — H26.9 UNSPECIFIED CATARACT: ICD-10-CM

## 2024-08-21 DIAGNOSIS — I48.91 UNSPECIFIED ATRIAL FIBRILLATION: ICD-10-CM

## 2024-08-21 DIAGNOSIS — J06.9 ACUTE UPPER RESPIRATORY INFECTION, UNSPECIFIED: ICD-10-CM

## 2024-08-21 PROBLEM — E11.9 TYPE 2 DIABETES MELLITUS WITHOUT COMPLICATION, WITHOUT LONG-TERM CURRENT USE OF INSULIN: Status: ACTIVE | Noted: 2024-08-21

## 2024-08-21 PROCEDURE — G2211 COMPLEX E/M VISIT ADD ON: CPT

## 2024-08-21 PROCEDURE — 99214 OFFICE O/P EST MOD 30 MIN: CPT

## 2024-08-21 RX ORDER — BENZONATATE 200 MG/1
200 CAPSULE ORAL
Qty: 1 | Refills: 0 | Status: ACTIVE | COMMUNITY
Start: 2024-08-21 | End: 1900-01-01

## 2024-08-21 RX ORDER — SODIUM CHLORIDE 0.65 %
0.65 AEROSOL, SPRAY (ML) NASAL TWICE DAILY
Qty: 1 | Refills: 0 | Status: ACTIVE | COMMUNITY
Start: 2024-08-21 | End: 1900-01-01

## 2024-08-21 NOTE — HISTORY OF PRESENT ILLNESS
[Two or more falls in past year] : Patient reported two or more falls in the past year [Completely Independent] : Completely independent. [Patient is independent with] : bathing [Independent] : housekeeping [Full assistance needed] : Assistance needed managing medications [FAST Score: ____] : Functional Assessment Scale (FAST) Score: [unfilled] [Cane] : cane [Smoke Detector] : smoke detector [Carbon Monoxide Detector] : carbon monoxide detector [Grab Bars] : grab bars [Night Light] : night light [Anti-Slip Measures] : anti-slip measures [Driving Concerns] : driving concerns noted [Wears Seat Belt] : wears seat belt [Wears Sunscreen] : wears sunscreen [NO] : No [Little interest or pleasure doing things] : 1) Little interest or pleasure doing things [Feeling down, depressed, or hopeless] : 2) Feeling down, depressed, or hopeless [0] : 2) Feeling down, depressed, or hopeless: Not at all (0) [PHQ-2 Negative - No further assessment needed] : PHQ-2 Negative - No further assessment needed [Mild] : Stage: Mild [Stable] : Status: Stable [Memory Lapses Or Loss] : improved memory impairment [Patient Observed To Be Agitated] : improved agitation [Hostility Toward Caregivers] : denies aggression [Sleep Disturbances] : improved sleep disturbances [] : denies wandering [Fixed Beliefs Contradicted By Reality (Delusions)] : denies delusions [Difficulty Finding Desired Words] : denies difficulty finding desired words [FreeTextEntry1] : 74 y/o F with PMHx of pAfib, CAD, DM, HLD, HTN, hypothyroidism, neuropathy, osteopenia, RA on MTX, cognitive decline, small berry aneurysm, vitamin D and B12 deficiency presenting to the practice for a follow up visit.   Accompanied by daughter/HCP, Ngoc Padilla ([phone]).  Cataracts: Recently completed b/l cataract repair w/o issues. Post-op visit with eye doctor appreciated in chart. Doing well, taking drops appropriately.  Cough: About 1 week now, COVID is going through [facility] but she has tested negative. No fevers or chills. Cough is slightly productive, no blood in the sputum. Taste is off but this has been for more than a year. No respiratory distress.  Dementia: MOCA: 21/30, GDS: 3/15. Moved to [facility] in [place], [place] which has significantly improved patient's life. Memantine not really helping at 5 mg. Will probably take off at the next visit.  Anxiety: Better with MM. Stable on sertraline 50 mg. No SI/SA. Increase sertraline to 75 mg daily..  DM: Following with endocrine. Last A1c now 7.1%. Goal is to stay below 7.6%  Afib: Stable on sotalol and Xarelto. Following with cards. HR on lower side.  HTN: Stable on Sotalol and Crestor.  Osteoporosis: On ibandronate, following with endo.  Hypothyroidism: TSH is low, on levothyroxine. Endocrine to adjust.  HCM: Lung nodule from screening CT-scan in Jan 2024, repeat in Jan 2025. [Stair Lift] : no stair lift used in home [Shower Chair] : no shower chair [[id number]] : 0

## 2024-08-21 NOTE — PHYSICAL EXAM

## 2024-08-23 ENCOUNTER — NON-APPOINTMENT (OUTPATIENT)
Age: 74
End: 2024-08-23

## 2024-08-23 ENCOUNTER — APPOINTMENT (OUTPATIENT)
Dept: OPHTHALMOLOGY | Facility: CLINIC | Age: 74
End: 2024-08-23
Payer: MEDICARE

## 2024-08-23 PROCEDURE — 99024 POSTOP FOLLOW-UP VISIT: CPT

## 2024-08-28 ENCOUNTER — APPOINTMENT (OUTPATIENT)
Dept: ENDOCRINOLOGY | Facility: CLINIC | Age: 74
End: 2024-08-28
Payer: MEDICARE

## 2024-08-28 VITALS
RESPIRATION RATE: 16 BRPM | WEIGHT: 144 LBS | BODY MASS INDEX: 23.99 KG/M2 | DIASTOLIC BLOOD PRESSURE: 62 MMHG | HEART RATE: 47 BPM | OXYGEN SATURATION: 99 % | HEIGHT: 65 IN | SYSTOLIC BLOOD PRESSURE: 112 MMHG

## 2024-08-28 DIAGNOSIS — E11.9 TYPE 2 DIABETES MELLITUS W/OUT COMPLICATIONS: ICD-10-CM

## 2024-08-28 DIAGNOSIS — E11.69 TYPE 2 DIABETES MELLITUS WITH OTHER SPECIFIED COMPLICATION: ICD-10-CM

## 2024-08-28 DIAGNOSIS — E78.5 TYPE 2 DIABETES MELLITUS WITH OTHER SPECIFIED COMPLICATION: ICD-10-CM

## 2024-08-28 DIAGNOSIS — E03.9 HYPOTHYROIDISM, UNSPECIFIED: ICD-10-CM

## 2024-08-28 DIAGNOSIS — I10 ESSENTIAL (PRIMARY) HYPERTENSION: ICD-10-CM

## 2024-08-28 PROCEDURE — G2211 COMPLEX E/M VISIT ADD ON: CPT

## 2024-08-28 PROCEDURE — 99214 OFFICE O/P EST MOD 30 MIN: CPT

## 2024-08-28 NOTE — ASSESSMENT
[FreeTextEntry1] : 73 yo woman with diagnoses of DM2, HTN, HLD and overweight and hypoT and Afib. She has neuropathy. Alzheimer accompanied by daughter. .  She tried MF IR and severe diarrhea. Repeated falls.   DM2 : a1c 71. since she moved to assisted  living. She gained some weight due to better foods  cont  jardiance 10 mg qd evelyn/c ratio normal. diabetic neuropathy: continue with B12 supplement encouraged more exercise walking 30 min 3 x week  HypoT / Hashimoto's TSh oversuppressed. Decrease Lt4 75 mcg to 6days/week   repeat tfts in 3 mos.   HTN: bp at target on meds. Continue current management.  HLD: lipids very good. cont statin avoid affty foods and fried foods as allowed by the home   Overweight: discussed diet and exercise   Osteopenia with FRAX 14/ 3.4 %.  continue actonel monthly. Compliant  cont vit d supplements and calcium 500 mg BID next DEAX May 2025    
Statement Selected

## 2024-10-07 ENCOUNTER — APPOINTMENT (OUTPATIENT)
Dept: OPHTHALMOLOGY | Facility: CLINIC | Age: 74
End: 2024-10-07
Payer: MEDICARE

## 2024-10-07 ENCOUNTER — NON-APPOINTMENT (OUTPATIENT)
Age: 74
End: 2024-10-07

## 2024-10-07 ENCOUNTER — APPOINTMENT (OUTPATIENT)
Dept: OPHTHALMOLOGY | Facility: CLINIC | Age: 74
End: 2024-10-07
Payer: SELF-PAY

## 2024-10-07 PROCEDURE — 99024 POSTOP FOLLOW-UP VISIT: CPT

## 2024-10-07 PROCEDURE — 92015 DETERMINE REFRACTIVE STATE: CPT

## 2024-10-10 ENCOUNTER — APPOINTMENT (OUTPATIENT)
Dept: RHEUMATOLOGY | Facility: CLINIC | Age: 74
End: 2024-10-10

## 2024-10-16 ENCOUNTER — APPOINTMENT (OUTPATIENT)
Dept: GERIATRICS | Facility: CLINIC | Age: 74
End: 2024-10-16
Payer: MEDICARE

## 2024-10-16 DIAGNOSIS — F33.40 MAJOR DEPRESSIVE DISORDER, RECURRENT, IN REMISSION, UNSPECIFIED: ICD-10-CM

## 2024-10-16 DIAGNOSIS — G30.1 ALZHEIMER'S DISEASE WITH LATE ONSET: ICD-10-CM

## 2024-10-16 DIAGNOSIS — R21 RASH AND OTHER NONSPECIFIC SKIN ERUPTION: ICD-10-CM

## 2024-10-16 DIAGNOSIS — F02.A3 ALZHEIMER'S DISEASE WITH LATE ONSET: ICD-10-CM

## 2024-10-16 PROCEDURE — 99443: CPT

## 2024-10-20 PROBLEM — R21 RASH: Status: ACTIVE | Noted: 2024-10-20

## 2024-10-24 ENCOUNTER — NON-APPOINTMENT (OUTPATIENT)
Age: 74
End: 2024-10-24

## 2024-10-24 ENCOUNTER — APPOINTMENT (OUTPATIENT)
Dept: GERIATRICS | Facility: CLINIC | Age: 74
End: 2024-10-24
Payer: MEDICARE

## 2024-10-24 ENCOUNTER — APPOINTMENT (OUTPATIENT)
Dept: CARDIOLOGY | Facility: CLINIC | Age: 74
End: 2024-10-24
Payer: MEDICARE

## 2024-10-24 VITALS
BODY MASS INDEX: 22.8 KG/M2 | HEART RATE: 43 BPM | RESPIRATION RATE: 14 BRPM | SYSTOLIC BLOOD PRESSURE: 144 MMHG | TEMPERATURE: 96.4 F | DIASTOLIC BLOOD PRESSURE: 70 MMHG | OXYGEN SATURATION: 98 % | WEIGHT: 137 LBS

## 2024-10-24 VITALS
OXYGEN SATURATION: 95 % | BODY MASS INDEX: 22.8 KG/M2 | WEIGHT: 137 LBS | DIASTOLIC BLOOD PRESSURE: 60 MMHG | SYSTOLIC BLOOD PRESSURE: 110 MMHG | HEART RATE: 44 BPM

## 2024-10-24 DIAGNOSIS — F41.9 ANXIETY DISORDER, UNSPECIFIED: ICD-10-CM

## 2024-10-24 DIAGNOSIS — I10 ESSENTIAL (PRIMARY) HYPERTENSION: ICD-10-CM

## 2024-10-24 DIAGNOSIS — E11.9 TYPE 2 DIABETES MELLITUS W/OUT COMPLICATIONS: ICD-10-CM

## 2024-10-24 DIAGNOSIS — F02.A3 ALZHEIMER'S DISEASE WITH LATE ONSET: ICD-10-CM

## 2024-10-24 DIAGNOSIS — R26.81 UNSTEADINESS ON FEET: ICD-10-CM

## 2024-10-24 DIAGNOSIS — F33.40 MAJOR DEPRESSIVE DISORDER, RECURRENT, IN REMISSION, UNSPECIFIED: ICD-10-CM

## 2024-10-24 DIAGNOSIS — E11.69 TYPE 2 DIABETES MELLITUS WITH OTHER SPECIFIED COMPLICATION: ICD-10-CM

## 2024-10-24 DIAGNOSIS — H26.9 UNSPECIFIED CATARACT: ICD-10-CM

## 2024-10-24 DIAGNOSIS — F32.A ANXIETY DISORDER, UNSPECIFIED: ICD-10-CM

## 2024-10-24 DIAGNOSIS — E78.00 PURE HYPERCHOLESTEROLEMIA, UNSPECIFIED: ICD-10-CM

## 2024-10-24 DIAGNOSIS — E78.5 TYPE 2 DIABETES MELLITUS WITH OTHER SPECIFIED COMPLICATION: ICD-10-CM

## 2024-10-24 DIAGNOSIS — G30.1 ALZHEIMER'S DISEASE WITH LATE ONSET: ICD-10-CM

## 2024-10-24 DIAGNOSIS — I15.2 TYPE 2 DIABETES MELLITUS WITH OTHER CIRCULATORY COMPLICATIONS: ICD-10-CM

## 2024-10-24 DIAGNOSIS — Z13.39 ENCOUNTER FOR SCREENING EXAM FOR OTHER MENTAL HEALTH AND BEHAVIORAL DISORDERS: ICD-10-CM

## 2024-10-24 DIAGNOSIS — Z00.00 ENCOUNTER FOR GENERAL ADULT MEDICAL EXAMINATION W/OUT ABNORMAL FINDINGS: ICD-10-CM

## 2024-10-24 DIAGNOSIS — Z23 ENCOUNTER FOR IMMUNIZATION: ICD-10-CM

## 2024-10-24 DIAGNOSIS — Z13.31 ENCOUNTER FOR SCREENING FOR DEPRESSION: ICD-10-CM

## 2024-10-24 DIAGNOSIS — I48.91 UNSPECIFIED ATRIAL FIBRILLATION: ICD-10-CM

## 2024-10-24 DIAGNOSIS — E11.59 TYPE 2 DIABETES MELLITUS WITH OTHER CIRCULATORY COMPLICATIONS: ICD-10-CM

## 2024-10-24 LAB
25(OH)D3 SERPL-MCNC: 39.1 NG/ML
ALBUMIN SERPL ELPH-MCNC: 4.5 G/DL
ALP BLD-CCNC: 71 U/L
ALT SERPL-CCNC: 13 U/L
ANION GAP SERPL CALC-SCNC: 12 MMOL/L
AST SERPL-CCNC: 25 U/L
BASOPHILS # BLD AUTO: 0.07 K/UL
BASOPHILS NFR BLD AUTO: 0.9 %
BILIRUB SERPL-MCNC: 0.5 MG/DL
BUN SERPL-MCNC: 15 MG/DL
CALCIUM SERPL-MCNC: 10.1 MG/DL
CHLORIDE SERPL-SCNC: 101 MMOL/L
CO2 SERPL-SCNC: 27 MMOL/L
CREAT SERPL-MCNC: 0.86 MG/DL
EGFR: 70 ML/MIN/1.73M2
EOSINOPHIL # BLD AUTO: 0.25 K/UL
EOSINOPHIL NFR BLD AUTO: 3.1 %
FOLATE SERPL-MCNC: 11.4 NG/ML
GLUCOSE SERPL-MCNC: 96 MG/DL
HCT VFR BLD CALC: 46 %
HGB BLD-MCNC: 13.7 G/DL
IMM GRANULOCYTES NFR BLD AUTO: 0.4 %
LYMPHOCYTES # BLD AUTO: 1.6 K/UL
LYMPHOCYTES NFR BLD AUTO: 20 %
MAN DIFF?: NORMAL
MCHC RBC-ENTMCNC: 27.3 PG
MCHC RBC-ENTMCNC: 29.8 GM/DL
MCV RBC AUTO: 91.8 FL
MONOCYTES # BLD AUTO: 0.58 K/UL
MONOCYTES NFR BLD AUTO: 7.2 %
NEUTROPHILS # BLD AUTO: 5.48 K/UL
NEUTROPHILS NFR BLD AUTO: 68.4 %
PLATELET # BLD AUTO: 242 K/UL
POTASSIUM SERPL-SCNC: 5 MMOL/L
PROT SERPL-MCNC: 7 G/DL
RBC # BLD: 5.01 M/UL
RBC # FLD: 13.9 %
SODIUM SERPL-SCNC: 140 MMOL/L
TSH SERPL-ACNC: 0.71 UIU/ML
VIT B12 SERPL-MCNC: 906 PG/ML
WBC # FLD AUTO: 8.01 K/UL

## 2024-10-24 PROCEDURE — G0444 DEPRESSION SCREEN ANNUAL: CPT

## 2024-10-24 PROCEDURE — G0439: CPT

## 2024-10-24 PROCEDURE — G0442 ANNUAL ALCOHOL SCREEN 15 MIN: CPT

## 2024-10-24 PROCEDURE — G2211 COMPLEX E/M VISIT ADD ON: CPT

## 2024-10-24 PROCEDURE — 93000 ELECTROCARDIOGRAM COMPLETE: CPT

## 2024-10-24 PROCEDURE — 99214 OFFICE O/P EST MOD 30 MIN: CPT

## 2024-10-24 RX ORDER — DONEPEZIL HYDROCHLORIDE 5 MG/1
5 TABLET ORAL
Qty: 90 | Refills: 0 | Status: ACTIVE | COMMUNITY
Start: 2024-10-24 | End: 1900-01-01

## 2024-10-24 RX ORDER — COLD-HOT PACK
125 MCG EACH MISCELLANEOUS
Qty: 90 | Refills: 0 | Status: ACTIVE | COMMUNITY
Start: 2024-10-24 | End: 1900-01-01

## 2024-10-29 PROBLEM — Z13.31 SCREENING FOR DEPRESSION: Status: ACTIVE | Noted: 2024-10-29

## 2024-10-29 PROBLEM — Z00.00 MEDICARE ANNUAL WELLNESS VISIT, SUBSEQUENT: Status: ACTIVE | Noted: 2024-10-29

## 2024-10-29 PROBLEM — Z13.39 SCREENING FOR ALCOHOLISM: Status: ACTIVE | Noted: 2024-10-29

## 2024-11-05 ENCOUNTER — APPOINTMENT (OUTPATIENT)
Dept: ENDOCRINOLOGY | Facility: CLINIC | Age: 74
End: 2024-11-05
Payer: MEDICARE

## 2024-11-05 VITALS
SYSTOLIC BLOOD PRESSURE: 118 MMHG | OXYGEN SATURATION: 98 % | HEART RATE: 49 BPM | HEIGHT: 65 IN | RESPIRATION RATE: 16 BRPM | DIASTOLIC BLOOD PRESSURE: 60 MMHG | WEIGHT: 137 LBS | BODY MASS INDEX: 22.82 KG/M2

## 2024-11-05 DIAGNOSIS — I10 ESSENTIAL (PRIMARY) HYPERTENSION: ICD-10-CM

## 2024-11-05 DIAGNOSIS — E78.5 TYPE 2 DIABETES MELLITUS WITH OTHER SPECIFIED COMPLICATION: ICD-10-CM

## 2024-11-05 DIAGNOSIS — E03.9 HYPOTHYROIDISM, UNSPECIFIED: ICD-10-CM

## 2024-11-05 DIAGNOSIS — E11.9 TYPE 2 DIABETES MELLITUS W/OUT COMPLICATIONS: ICD-10-CM

## 2024-11-05 DIAGNOSIS — E11.69 TYPE 2 DIABETES MELLITUS WITH OTHER SPECIFIED COMPLICATION: ICD-10-CM

## 2024-11-05 PROCEDURE — G2211 COMPLEX E/M VISIT ADD ON: CPT

## 2024-11-05 PROCEDURE — 99214 OFFICE O/P EST MOD 30 MIN: CPT

## 2024-11-08 ENCOUNTER — APPOINTMENT (OUTPATIENT)
Dept: GERIATRICS | Facility: CLINIC | Age: 74
End: 2024-11-08
Payer: MEDICARE

## 2024-11-08 DIAGNOSIS — G30.1 ALZHEIMER'S DISEASE WITH LATE ONSET: ICD-10-CM

## 2024-11-08 DIAGNOSIS — F12.90 CANNABIS USE, UNSPECIFIED, UNCOMPLICATED: ICD-10-CM

## 2024-11-08 DIAGNOSIS — F02.A3 ALZHEIMER'S DISEASE WITH LATE ONSET: ICD-10-CM

## 2024-11-08 DIAGNOSIS — F33.40 MAJOR DEPRESSIVE DISORDER, RECURRENT, IN REMISSION, UNSPECIFIED: ICD-10-CM

## 2024-11-08 DIAGNOSIS — R26.81 UNSTEADINESS ON FEET: ICD-10-CM

## 2024-11-08 PROCEDURE — 99443: CPT

## 2024-11-08 RX ORDER — MEMANTINE HYDROCHLORIDE 14 MG/1
14 CAPSULE, EXTENDED RELEASE ORAL
Qty: 90 | Refills: 0 | Status: ACTIVE | COMMUNITY
Start: 2024-11-08 | End: 1900-01-01

## 2024-11-08 RX ORDER — SODIUM CHLORIDE 0.65 %
0.65 AEROSOL, SPRAY (ML) NASAL TWICE DAILY
Qty: 1 | Refills: 3 | Status: ACTIVE | COMMUNITY
Start: 2024-11-08 | End: 1900-01-01

## 2024-11-25 RX ORDER — IPRATROPIUM BROMIDE 21 UG/1
0.03 SPRAY NASAL 3 TIMES DAILY
Qty: 1 | Refills: 2 | Status: ACTIVE | COMMUNITY
Start: 2024-11-25 | End: 1900-01-01

## 2025-01-02 ENCOUNTER — RX RENEWAL (OUTPATIENT)
Age: 75
End: 2025-01-02

## 2025-01-11 DIAGNOSIS — K59.00 CONSTIPATION, UNSPECIFIED: ICD-10-CM

## 2025-01-11 RX ORDER — SENNOSIDES 8.6 MG/1
8.6 CAPSULE, GELATIN COATED ORAL
Qty: 30 | Refills: 0 | Status: ACTIVE | COMMUNITY
Start: 2025-01-11 | End: 1900-01-01

## 2025-01-11 RX ORDER — POLYETHYLENE GLYCOL 3350 17 G/17G
17 POWDER, FOR SOLUTION ORAL DAILY
Qty: 1 | Refills: 0 | Status: ACTIVE | COMMUNITY
Start: 2025-01-11 | End: 1900-01-01

## 2025-01-27 ENCOUNTER — APPOINTMENT (OUTPATIENT)
Dept: GERIATRICS | Facility: CLINIC | Age: 75
End: 2025-01-27

## 2025-01-28 ENCOUNTER — APPOINTMENT (OUTPATIENT)
Dept: GERIATRICS | Facility: CLINIC | Age: 75
End: 2025-01-28
Payer: MEDICARE

## 2025-01-28 VITALS
TEMPERATURE: 97.5 F | SYSTOLIC BLOOD PRESSURE: 117 MMHG | DIASTOLIC BLOOD PRESSURE: 74 MMHG | HEART RATE: 45 BPM | WEIGHT: 138 LBS | OXYGEN SATURATION: 97 % | RESPIRATION RATE: 14 BRPM | BODY MASS INDEX: 22.96 KG/M2

## 2025-01-28 DIAGNOSIS — F33.40 MAJOR DEPRESSIVE DISORDER, RECURRENT, IN REMISSION, UNSPECIFIED: ICD-10-CM

## 2025-01-28 DIAGNOSIS — K59.00 CONSTIPATION, UNSPECIFIED: ICD-10-CM

## 2025-01-28 DIAGNOSIS — G30.1 ALZHEIMER'S DISEASE WITH LATE ONSET: ICD-10-CM

## 2025-01-28 DIAGNOSIS — F02.A3 ALZHEIMER'S DISEASE WITH LATE ONSET: ICD-10-CM

## 2025-01-28 DIAGNOSIS — M81.0 AGE-RELATED OSTEOPOROSIS W/OUT CURRENT PATHOLOGICAL FRACTURE: ICD-10-CM

## 2025-01-28 DIAGNOSIS — E78.5 TYPE 2 DIABETES MELLITUS WITH OTHER SPECIFIED COMPLICATION: ICD-10-CM

## 2025-01-28 DIAGNOSIS — E03.9 HYPOTHYROIDISM, UNSPECIFIED: ICD-10-CM

## 2025-01-28 DIAGNOSIS — G89.29 LOW BACK PAIN, UNSPECIFIED: ICD-10-CM

## 2025-01-28 DIAGNOSIS — R91.8 OTHER NONSPECIFIC ABNORMAL FINDING OF LUNG FIELD: ICD-10-CM

## 2025-01-28 DIAGNOSIS — E11.69 TYPE 2 DIABETES MELLITUS WITH OTHER SPECIFIED COMPLICATION: ICD-10-CM

## 2025-01-28 DIAGNOSIS — I48.91 UNSPECIFIED ATRIAL FIBRILLATION: ICD-10-CM

## 2025-01-28 DIAGNOSIS — M54.50 LOW BACK PAIN, UNSPECIFIED: ICD-10-CM

## 2025-01-28 DIAGNOSIS — E11.9 TYPE 2 DIABETES MELLITUS W/OUT COMPLICATIONS: ICD-10-CM

## 2025-01-28 DIAGNOSIS — R53.81 OTHER MALAISE: ICD-10-CM

## 2025-01-28 DIAGNOSIS — R26.81 UNSTEADINESS ON FEET: ICD-10-CM

## 2025-01-28 PROCEDURE — 99214 OFFICE O/P EST MOD 30 MIN: CPT

## 2025-01-28 PROCEDURE — G2211 COMPLEX E/M VISIT ADD ON: CPT

## 2025-01-29 PROBLEM — M54.50 CHRONIC LOWER BACK PAIN: Status: ACTIVE | Noted: 2025-01-28

## 2025-01-29 PROBLEM — R91.8 PULMONARY NODULES: Status: ACTIVE | Noted: 2025-01-28

## 2025-02-03 ENCOUNTER — NON-APPOINTMENT (OUTPATIENT)
Age: 75
End: 2025-02-03

## 2025-02-03 LAB
25(OH)D3 SERPL-MCNC: 44.5 NG/ML
ALBUMIN SERPL ELPH-MCNC: 4.1 G/DL
ALP BLD-CCNC: 78 U/L
ALT SERPL-CCNC: 11 U/L
ANION GAP SERPL CALC-SCNC: 16 MMOL/L
AST SERPL-CCNC: 18 U/L
BASOPHILS # BLD AUTO: 0.04 K/UL
BASOPHILS NFR BLD AUTO: 0.6 %
BILIRUB SERPL-MCNC: 0.3 MG/DL
BUN SERPL-MCNC: 29 MG/DL
CALCIUM SERPL-MCNC: 9.3 MG/DL
CHLORIDE SERPL-SCNC: 107 MMOL/L
CHOLEST SERPL-MCNC: 185 MG/DL
CO2 SERPL-SCNC: 19 MMOL/L
CREAT SERPL-MCNC: 1.13 MG/DL
EGFR: 50 ML/MIN/1.73M2
EOSINOPHIL # BLD AUTO: 0.11 K/UL
EOSINOPHIL NFR BLD AUTO: 1.7 %
FOLATE SERPL-MCNC: 11.4 NG/ML
GLUCOSE SERPL-MCNC: 95 MG/DL
HCT VFR BLD CALC: 40 %
HDLC SERPL-MCNC: 55 MG/DL
HGB BLD-MCNC: 12.4 G/DL
IMM GRANULOCYTES NFR BLD AUTO: 0.2 %
LDLC SERPL CALC-MCNC: 115 MG/DL
LYMPHOCYTES # BLD AUTO: 1.77 K/UL
LYMPHOCYTES NFR BLD AUTO: 28.1 %
MAN DIFF?: NORMAL
MCHC RBC-ENTMCNC: 30.7 PG
MCHC RBC-ENTMCNC: 31 G/DL
MCV RBC AUTO: 99 FL
MONOCYTES # BLD AUTO: 0.67 K/UL
MONOCYTES NFR BLD AUTO: 10.6 %
NEUTROPHILS # BLD AUTO: 3.7 K/UL
NEUTROPHILS NFR BLD AUTO: 58.8 %
NONHDLC SERPL-MCNC: 130 MG/DL
PLATELET # BLD AUTO: 267 K/UL
POTASSIUM SERPL-SCNC: 4.2 MMOL/L
PROT SERPL-MCNC: 6.2 G/DL
RBC # BLD: 4.04 M/UL
RBC # FLD: 13.3 %
SODIUM SERPL-SCNC: 142 MMOL/L
TRIGL SERPL-MCNC: 80 MG/DL
TSH SERPL-ACNC: 2.23 UIU/ML
VIT B12 SERPL-MCNC: 325 PG/ML
WBC # FLD AUTO: 6.3 K/UL

## 2025-02-24 ENCOUNTER — APPOINTMENT (OUTPATIENT)
Dept: GERIATRICS | Facility: CLINIC | Age: 75
End: 2025-02-24
Payer: MEDICARE

## 2025-02-24 VITALS
RESPIRATION RATE: 14 BRPM | HEIGHT: 65 IN | TEMPERATURE: 97.9 F | SYSTOLIC BLOOD PRESSURE: 154 MMHG | BODY MASS INDEX: 23.16 KG/M2 | WEIGHT: 139 LBS | OXYGEN SATURATION: 98 % | DIASTOLIC BLOOD PRESSURE: 79 MMHG | HEART RATE: 42 BPM

## 2025-02-24 DIAGNOSIS — G30.1 ALZHEIMER'S DISEASE WITH LATE ONSET: ICD-10-CM

## 2025-02-24 DIAGNOSIS — F02.B4 ALZHEIMER'S DISEASE WITH LATE ONSET: ICD-10-CM

## 2025-02-24 DIAGNOSIS — R26.81 UNSTEADINESS ON FEET: ICD-10-CM

## 2025-02-24 DIAGNOSIS — I48.91 UNSPECIFIED ATRIAL FIBRILLATION: ICD-10-CM

## 2025-02-24 DIAGNOSIS — F33.40 MAJOR DEPRESSIVE DISORDER, RECURRENT, IN REMISSION, UNSPECIFIED: ICD-10-CM

## 2025-02-24 DIAGNOSIS — R91.8 OTHER NONSPECIFIC ABNORMAL FINDING OF LUNG FIELD: ICD-10-CM

## 2025-02-24 PROCEDURE — 99483 ASSMT & CARE PLN PT COG IMP: CPT

## 2025-03-01 PROBLEM — G30.1 MODERATE LATE ONSET ALZHEIMER'S DEMENTIA WITH ANXIETY: Status: ACTIVE | Noted: 2025-03-01

## 2025-03-07 ENCOUNTER — APPOINTMENT (OUTPATIENT)
Dept: GERIATRICS | Facility: CLINIC | Age: 75
End: 2025-03-07

## 2025-03-10 ENCOUNTER — APPOINTMENT (OUTPATIENT)
Dept: CT IMAGING | Facility: CLINIC | Age: 75
End: 2025-03-10

## 2025-03-24 ENCOUNTER — APPOINTMENT (OUTPATIENT)
Dept: ENDOCRINOLOGY | Facility: CLINIC | Age: 75
End: 2025-03-24
Payer: MEDICARE

## 2025-03-24 VITALS
HEIGHT: 65 IN | RESPIRATION RATE: 16 BRPM | DIASTOLIC BLOOD PRESSURE: 62 MMHG | WEIGHT: 142 LBS | BODY MASS INDEX: 23.66 KG/M2 | SYSTOLIC BLOOD PRESSURE: 116 MMHG | OXYGEN SATURATION: 97 % | HEART RATE: 51 BPM

## 2025-03-24 DIAGNOSIS — E11.69 TYPE 2 DIABETES MELLITUS WITH OTHER SPECIFIED COMPLICATION: ICD-10-CM

## 2025-03-24 DIAGNOSIS — E03.9 HYPOTHYROIDISM, UNSPECIFIED: ICD-10-CM

## 2025-03-24 DIAGNOSIS — I10 ESSENTIAL (PRIMARY) HYPERTENSION: ICD-10-CM

## 2025-03-24 DIAGNOSIS — E78.5 TYPE 2 DIABETES MELLITUS WITH OTHER SPECIFIED COMPLICATION: ICD-10-CM

## 2025-03-24 DIAGNOSIS — E11.9 TYPE 2 DIABETES MELLITUS W/OUT COMPLICATIONS: ICD-10-CM

## 2025-03-24 PROCEDURE — 99214 OFFICE O/P EST MOD 30 MIN: CPT

## 2025-03-24 PROCEDURE — G2211 COMPLEX E/M VISIT ADD ON: CPT

## 2025-03-24 RX ORDER — EMPAGLIFLOZIN 25 MG/1
25 TABLET, FILM COATED ORAL DAILY
Qty: 90 | Refills: 1 | Status: ACTIVE | COMMUNITY
Start: 2025-03-24 | End: 1900-01-01

## 2025-04-04 ENCOUNTER — NON-APPOINTMENT (OUTPATIENT)
Age: 75
End: 2025-04-04

## 2025-04-04 ENCOUNTER — APPOINTMENT (OUTPATIENT)
Dept: OPHTHALMOLOGY | Facility: CLINIC | Age: 75
End: 2025-04-04
Payer: MEDICARE

## 2025-04-04 PROCEDURE — 92014 COMPRE OPH EXAM EST PT 1/>: CPT

## 2025-05-01 ENCOUNTER — APPOINTMENT (OUTPATIENT)
Dept: CARDIOLOGY | Facility: CLINIC | Age: 75
End: 2025-05-01
Payer: MEDICARE

## 2025-05-01 ENCOUNTER — NON-APPOINTMENT (OUTPATIENT)
Age: 75
End: 2025-05-01

## 2025-05-01 VITALS
HEART RATE: 60 BPM | DIASTOLIC BLOOD PRESSURE: 68 MMHG | HEIGHT: 65 IN | WEIGHT: 143 LBS | SYSTOLIC BLOOD PRESSURE: 110 MMHG | OXYGEN SATURATION: 98 % | BODY MASS INDEX: 23.82 KG/M2

## 2025-05-01 DIAGNOSIS — I10 ESSENTIAL (PRIMARY) HYPERTENSION: ICD-10-CM

## 2025-05-01 DIAGNOSIS — I48.91 UNSPECIFIED ATRIAL FIBRILLATION: ICD-10-CM

## 2025-05-01 DIAGNOSIS — I25.10 ATHEROSCLEROTIC HEART DISEASE OF NATIVE CORONARY ARTERY W/OUT ANGINA PECTORIS: ICD-10-CM

## 2025-05-01 DIAGNOSIS — R26.81 UNSTEADINESS ON FEET: ICD-10-CM

## 2025-05-01 DIAGNOSIS — E78.00 PURE HYPERCHOLESTEROLEMIA, UNSPECIFIED: ICD-10-CM

## 2025-05-01 DIAGNOSIS — E11.9 TYPE 2 DIABETES MELLITUS W/OUT COMPLICATIONS: ICD-10-CM

## 2025-05-01 PROCEDURE — 99214 OFFICE O/P EST MOD 30 MIN: CPT

## 2025-05-01 PROCEDURE — 93000 ELECTROCARDIOGRAM COMPLETE: CPT

## 2025-05-01 PROCEDURE — G2211 COMPLEX E/M VISIT ADD ON: CPT

## 2025-05-27 ENCOUNTER — APPOINTMENT (OUTPATIENT)
Dept: GERIATRICS | Facility: CLINIC | Age: 75
End: 2025-05-27

## 2025-06-24 ENCOUNTER — APPOINTMENT (OUTPATIENT)
Dept: ENDOCRINOLOGY | Facility: CLINIC | Age: 75
End: 2025-06-24
Payer: MEDICARE

## 2025-06-24 VITALS
HEART RATE: 60 BPM | OXYGEN SATURATION: 98 % | BODY MASS INDEX: 24.91 KG/M2 | WEIGHT: 149.5 LBS | TEMPERATURE: 98 F | DIASTOLIC BLOOD PRESSURE: 80 MMHG | RESPIRATION RATE: 16 BRPM | HEIGHT: 65 IN | SYSTOLIC BLOOD PRESSURE: 120 MMHG

## 2025-06-24 PROCEDURE — 99214 OFFICE O/P EST MOD 30 MIN: CPT

## 2025-06-24 PROCEDURE — G2211 COMPLEX E/M VISIT ADD ON: CPT

## 2025-06-24 RX ORDER — MEMANTINE HYDROCHLORIDE 7 MG/1
7 CAPSULE, EXTENDED RELEASE ORAL
Qty: 30 | Refills: 0 | Status: ACTIVE | COMMUNITY
Start: 2025-06-03

## 2025-06-24 RX ORDER — METFORMIN ER 500 MG 500 MG/1
500 TABLET ORAL
Qty: 90 | Refills: 1 | Status: ACTIVE | COMMUNITY
Start: 2025-06-24 | End: 1900-01-01

## 2025-07-10 ENCOUNTER — APPOINTMENT (OUTPATIENT)
Dept: RADIOLOGY | Facility: CLINIC | Age: 75
End: 2025-07-10
Payer: MEDICARE

## 2025-07-10 PROCEDURE — 77085 DXA BONE DENSITY AXL VRT FX: CPT

## 2025-07-29 ENCOUNTER — APPOINTMENT (OUTPATIENT)
Dept: RHEUMATOLOGY | Facility: CLINIC | Age: 75
End: 2025-07-29

## 2025-08-25 ENCOUNTER — APPOINTMENT (OUTPATIENT)
Dept: GERIATRICS | Facility: CLINIC | Age: 75
End: 2025-08-25